# Patient Record
Sex: MALE | Race: WHITE | Employment: FULL TIME | URBAN - NONMETROPOLITAN AREA
[De-identification: names, ages, dates, MRNs, and addresses within clinical notes are randomized per-mention and may not be internally consistent; named-entity substitution may affect disease eponyms.]

---

## 2019-07-11 VITALS
WEIGHT: 176 LBS | HEART RATE: 92 BPM | HEIGHT: 69 IN | BODY MASS INDEX: 26.07 KG/M2 | TEMPERATURE: 98.1 F | DIASTOLIC BLOOD PRESSURE: 82 MMHG | SYSTOLIC BLOOD PRESSURE: 126 MMHG

## 2019-07-11 RX ORDER — RANITIDINE 150 MG/1
150 TABLET ORAL EVERY MORNING
COMMUNITY
End: 2019-10-11 | Stop reason: ALTCHOICE

## 2019-07-12 ENCOUNTER — OFFICE VISIT (OUTPATIENT)
Dept: FAMILY MEDICINE CLINIC | Age: 34
End: 2019-07-12
Payer: COMMERCIAL

## 2019-07-12 VITALS
BODY MASS INDEX: 25.92 KG/M2 | HEART RATE: 74 BPM | WEIGHT: 175 LBS | OXYGEN SATURATION: 99 % | SYSTOLIC BLOOD PRESSURE: 124 MMHG | DIASTOLIC BLOOD PRESSURE: 86 MMHG | HEIGHT: 69 IN | TEMPERATURE: 98.3 F

## 2019-07-12 DIAGNOSIS — F90.0 ATTENTION DEFICIT HYPERACTIVITY DISORDER (ADHD), PREDOMINANTLY INATTENTIVE TYPE: Primary | ICD-10-CM

## 2019-07-12 DIAGNOSIS — F34.1 DYSTHYMIA: ICD-10-CM

## 2019-07-12 PROCEDURE — 99213 OFFICE O/P EST LOW 20 MIN: CPT | Performed by: FAMILY MEDICINE

## 2019-07-12 RX ORDER — METHYLPHENIDATE HYDROCHLORIDE 20 MG/1
20 CAPSULE, EXTENDED RELEASE ORAL 2 TIMES DAILY
Qty: 60 CAPSULE | Refills: 0 | Status: SHIPPED | OUTPATIENT
Start: 2019-07-12 | End: 2019-07-12 | Stop reason: SDUPTHER

## 2019-07-12 RX ORDER — ESCITALOPRAM OXALATE 10 MG/1
15 TABLET ORAL DAILY
Qty: 135 TABLET | Refills: 1 | Status: SHIPPED | OUTPATIENT
Start: 2019-07-12 | End: 2019-10-11 | Stop reason: SDUPTHER

## 2019-07-12 RX ORDER — METHYLPHENIDATE HYDROCHLORIDE 20 MG/1
20 CAPSULE, EXTENDED RELEASE ORAL 2 TIMES DAILY
Qty: 60 CAPSULE | Refills: 0 | Status: SHIPPED | OUTPATIENT
Start: 2019-09-10 | End: 2019-10-11 | Stop reason: SDUPTHER

## 2019-07-12 RX ORDER — METHYLPHENIDATE HYDROCHLORIDE 20 MG/1
20 CAPSULE, EXTENDED RELEASE ORAL 2 TIMES DAILY
Qty: 60 CAPSULE | Refills: 0 | Status: SHIPPED | OUTPATIENT
Start: 2019-08-11 | End: 2019-07-12 | Stop reason: SDUPTHER

## 2019-07-12 RX ORDER — ALPRAZOLAM 0.25 MG/1
0.25 TABLET ORAL 2 TIMES DAILY
Qty: 180 TABLET | Refills: 0 | Status: SHIPPED | OUTPATIENT
Start: 2019-07-12 | End: 2019-10-11 | Stop reason: SDUPTHER

## 2019-07-12 ASSESSMENT — PATIENT HEALTH QUESTIONNAIRE - PHQ9
1. LITTLE INTEREST OR PLEASURE IN DOING THINGS: 0
SUM OF ALL RESPONSES TO PHQ QUESTIONS 1-9: 0
SUM OF ALL RESPONSES TO PHQ9 QUESTIONS 1 & 2: 0
2. FEELING DOWN, DEPRESSED OR HOPELESS: 0
SUM OF ALL RESPONSES TO PHQ QUESTIONS 1-9: 0

## 2019-09-19 ENCOUNTER — PATIENT MESSAGE (OUTPATIENT)
Dept: FAMILY MEDICINE CLINIC | Age: 34
End: 2019-09-19

## 2019-09-20 ENCOUNTER — OFFICE VISIT (OUTPATIENT)
Dept: FAMILY MEDICINE CLINIC | Age: 34
End: 2019-09-20
Payer: COMMERCIAL

## 2019-09-20 ENCOUNTER — HOSPITAL ENCOUNTER (OUTPATIENT)
Age: 34
Discharge: HOME | End: 2019-09-22
Payer: COMMERCIAL

## 2019-09-20 VITALS
TEMPERATURE: 97.8 F | DIASTOLIC BLOOD PRESSURE: 74 MMHG | SYSTOLIC BLOOD PRESSURE: 118 MMHG | OXYGEN SATURATION: 97 % | WEIGHT: 171 LBS | BODY MASS INDEX: 25.25 KG/M2 | HEART RATE: 114 BPM

## 2019-09-20 DIAGNOSIS — R10.32 LEFT LOWER QUADRANT PAIN: Primary | ICD-10-CM

## 2019-09-20 DIAGNOSIS — R30.0 DYSURIA: ICD-10-CM

## 2019-09-20 LAB
BILIRUBIN, POC: NORMAL
BLOOD URINE, POC: NORMAL
CLARITY, POC: CLEAR
COLOR, POC: NORMAL
GLUCOSE URINE, POC: NORMAL
KETONES, POC: NORMAL
LEUKOCYTE EST, POC: NORMAL
NITRITE, POC: NORMAL
PH, POC: 6.5
PROTEIN, POC: NORMAL
SPECIFIC GRAVITY, POC: 1.02
UROBILINOGEN, POC: >=8

## 2019-09-20 PROCEDURE — 81002 URINALYSIS NONAUTO W/O SCOPE: CPT | Performed by: FAMILY MEDICINE

## 2019-09-20 PROCEDURE — 87088 URINE BACTERIA CULTURE: CPT

## 2019-09-20 PROCEDURE — 99214 OFFICE O/P EST MOD 30 MIN: CPT | Performed by: FAMILY MEDICINE

## 2019-09-20 RX ORDER — CIPROFLOXACIN 500 MG/1
500 TABLET, FILM COATED ORAL 2 TIMES DAILY
Qty: 14 TABLET | Refills: 0 | Status: SHIPPED | OUTPATIENT
Start: 2019-09-20 | End: 2019-09-27

## 2019-09-22 LAB — URINE CULTURE, ROUTINE: NORMAL

## 2019-09-23 ENCOUNTER — PATIENT MESSAGE (OUTPATIENT)
Dept: FAMILY MEDICINE CLINIC | Age: 34
End: 2019-09-23

## 2019-10-11 ENCOUNTER — OFFICE VISIT (OUTPATIENT)
Dept: FAMILY MEDICINE CLINIC | Age: 34
End: 2019-10-11
Payer: COMMERCIAL

## 2019-10-11 VITALS
WEIGHT: 171 LBS | HEART RATE: 88 BPM | DIASTOLIC BLOOD PRESSURE: 74 MMHG | OXYGEN SATURATION: 97 % | TEMPERATURE: 98.7 F | BODY MASS INDEX: 25.33 KG/M2 | SYSTOLIC BLOOD PRESSURE: 118 MMHG | HEIGHT: 69 IN

## 2019-10-11 DIAGNOSIS — F90.0 ATTENTION DEFICIT HYPERACTIVITY DISORDER (ADHD), PREDOMINANTLY INATTENTIVE TYPE: ICD-10-CM

## 2019-10-11 DIAGNOSIS — F34.1 DYSTHYMIA: ICD-10-CM

## 2019-10-11 PROCEDURE — 99213 OFFICE O/P EST LOW 20 MIN: CPT | Performed by: FAMILY MEDICINE

## 2019-10-11 RX ORDER — METHYLPHENIDATE HYDROCHLORIDE 20 MG/1
20 CAPSULE, EXTENDED RELEASE ORAL 2 TIMES DAILY
Qty: 60 CAPSULE | Refills: 0 | Status: SHIPPED | OUTPATIENT
Start: 2019-10-11 | End: 2019-10-11 | Stop reason: SDUPTHER

## 2019-10-11 RX ORDER — ALPRAZOLAM 0.25 MG/1
0.25 TABLET ORAL 2 TIMES DAILY
Qty: 180 TABLET | Refills: 0 | Status: SHIPPED | OUTPATIENT
Start: 2019-10-11 | End: 2019-10-11 | Stop reason: SDUPTHER

## 2019-10-11 RX ORDER — ESCITALOPRAM OXALATE 10 MG/1
15 TABLET ORAL DAILY
Qty: 135 TABLET | Refills: 1 | Status: SHIPPED
Start: 2019-10-11 | End: 2020-04-10 | Stop reason: SDUPTHER

## 2019-10-11 RX ORDER — METHYLPHENIDATE HYDROCHLORIDE 20 MG/1
20 CAPSULE, EXTENDED RELEASE ORAL 2 TIMES DAILY
Qty: 60 CAPSULE | Refills: 0 | Status: SHIPPED | OUTPATIENT
Start: 2019-11-10 | End: 2019-10-11 | Stop reason: SDUPTHER

## 2019-10-11 RX ORDER — ALPRAZOLAM 0.25 MG/1
0.25 TABLET ORAL 2 TIMES DAILY
Qty: 180 TABLET | Refills: 0 | Status: SHIPPED | OUTPATIENT
Start: 2019-10-11 | End: 2020-01-09

## 2019-10-11 RX ORDER — METHYLPHENIDATE HYDROCHLORIDE 20 MG/1
20 CAPSULE, EXTENDED RELEASE ORAL 2 TIMES DAILY
Qty: 60 CAPSULE | Refills: 0 | Status: SHIPPED | OUTPATIENT
Start: 2019-12-09 | End: 2020-01-10 | Stop reason: SDUPTHER

## 2020-01-10 ENCOUNTER — OFFICE VISIT (OUTPATIENT)
Dept: FAMILY MEDICINE CLINIC | Age: 35
End: 2020-01-10
Payer: COMMERCIAL

## 2020-01-10 VITALS
HEART RATE: 71 BPM | SYSTOLIC BLOOD PRESSURE: 116 MMHG | HEIGHT: 70 IN | WEIGHT: 172 LBS | OXYGEN SATURATION: 99 % | DIASTOLIC BLOOD PRESSURE: 78 MMHG | TEMPERATURE: 98.2 F | BODY MASS INDEX: 24.62 KG/M2

## 2020-01-10 PROCEDURE — 99213 OFFICE O/P EST LOW 20 MIN: CPT | Performed by: FAMILY MEDICINE

## 2020-01-10 RX ORDER — METHYLPHENIDATE HYDROCHLORIDE 20 MG/1
20 CAPSULE, EXTENDED RELEASE ORAL 2 TIMES DAILY
Qty: 60 CAPSULE | Refills: 0 | Status: SHIPPED | OUTPATIENT
Start: 2020-01-10 | End: 2020-01-10 | Stop reason: SDUPTHER

## 2020-01-10 RX ORDER — ALPRAZOLAM 0.25 MG/1
0.25 TABLET ORAL 2 TIMES DAILY
COMMUNITY
Start: 2009-02-24 | End: 2020-01-10 | Stop reason: SDUPTHER

## 2020-01-10 RX ORDER — ALPRAZOLAM 0.25 MG/1
0.25 TABLET ORAL 2 TIMES DAILY
Qty: 180 TABLET | Refills: 0 | Status: SHIPPED | OUTPATIENT
Start: 2020-01-10 | End: 2020-04-10 | Stop reason: SDUPTHER

## 2020-01-10 RX ORDER — METHYLPHENIDATE HYDROCHLORIDE 20 MG/1
20 CAPSULE, EXTENDED RELEASE ORAL 2 TIMES DAILY
Qty: 60 CAPSULE | Refills: 0 | Status: SHIPPED | OUTPATIENT
Start: 2020-02-09 | End: 2020-01-10 | Stop reason: SDUPTHER

## 2020-01-10 RX ORDER — METHYLPHENIDATE HYDROCHLORIDE 20 MG/1
20 CAPSULE, EXTENDED RELEASE ORAL 2 TIMES DAILY
Qty: 60 CAPSULE | Refills: 0 | Status: SHIPPED | OUTPATIENT
Start: 2020-03-09 | End: 2020-04-10 | Stop reason: SDUPTHER

## 2020-01-10 NOTE — PROGRESS NOTES
1/10/20    Chief Complaint   Patient presents with    Anxiety        S: patient here for PE of chronic medical problems, med recheck/refill, Oarrs review. Doing well. Family History   Problem Relation Age of Onset    High Blood Pressure Father     Cancer Father        Past Surgical History:   Procedure Laterality Date    WISDOM TOOTH EXTRACTION         Social History     Tobacco Use    Smoking status: Former Smoker     Packs/day: 1.00     Years: 10.00     Pack years: 10.00     Types: Cigarettes     Last attempt to quit: 2010     Years since quittin.5    Smokeless tobacco: Never Used   Substance Use Topics    Alcohol use: No    Drug use: No       ROS:  No CP, No palpitations,   No sob, No cough,   No abd pain, No heartburn,   No headaches,   No tingling, No numbness, No weakness,   No bowel changes, No hematochezia, No melena,  No bladder changes, No hematuria  No skin rashes, No skin lesions. No vision changes, No hearing changes,   No polyuria, polydipsia, polyphagia. Stable mood. ROS otherwise negative unless as listed in HPI. Chart reviewed and updated where appropriate for PMH, Fam, and Soc Hx. Physical Exam   /78   Pulse 71   Temp 98.2 °F (36.8 °C) (Temporal)   Ht 5' 10\" (1.778 m)   Wt 172 lb (78 kg)   SpO2 99%   BMI 24.68 kg/m²   Wt Readings from Last 3 Encounters:   01/10/20 172 lb (78 kg)   10/11/19 171 lb (77.6 kg)   19 171 lb (77.6 kg)       Constitutional:    He is oriented to person, place, and time. He appears well-developed and well-nourished. HENT:    Right Ear: Tympanic membrane, external ear and ear canal normal.    Left Ear: Tympanic membrane, external ear and ear canal normal.    Nose: Nose normal.    Mouth/Throat: Oropharynx is clear and moist.   Eyes:    Conjunctivae are normal.    Pupils are equal, round, and reactive to light. EOMI. Neck:    Normal range of motion. No thyromegaly or nodules noted. No bruit.   Cardiovascular:

## 2020-03-15 ENCOUNTER — PATIENT MESSAGE (OUTPATIENT)
Dept: FAMILY MEDICINE CLINIC | Age: 35
End: 2020-03-15

## 2020-04-03 NOTE — TELEPHONE ENCOUNTER
Pt has 3 meds that need refilled. Alprazelam, Lexapro and Ritalin. [facility] [place]. Also would like to know how this will work with his Ritalin, he usually picks up a script every month? Please contact pt with further instructions.

## 2020-04-07 RX ORDER — METHYLPHENIDATE HYDROCHLORIDE 20 MG/1
20 CAPSULE, EXTENDED RELEASE ORAL 2 TIMES DAILY
Qty: 60 CAPSULE | Refills: 0 | OUTPATIENT
Start: 2020-04-07 | End: 2020-05-07

## 2020-04-07 RX ORDER — ESCITALOPRAM OXALATE 10 MG/1
15 TABLET ORAL DAILY
Qty: 135 TABLET | Refills: 1 | OUTPATIENT
Start: 2020-04-07 | End: 2020-07-07

## 2020-04-07 RX ORDER — ALPRAZOLAM 0.25 MG/1
0.25 TABLET ORAL 2 TIMES DAILY
Qty: 180 TABLET | Refills: 0 | OUTPATIENT
Start: 2020-04-07 | End: 2020-07-06

## 2020-04-10 ENCOUNTER — VIRTUAL VISIT (OUTPATIENT)
Dept: PRIMARY CARE CLINIC | Age: 35
End: 2020-04-10
Payer: COMMERCIAL

## 2020-04-10 VITALS — WEIGHT: 172 LBS | BODY MASS INDEX: 24.62 KG/M2 | HEIGHT: 70 IN | TEMPERATURE: 97.7 F

## 2020-04-10 PROCEDURE — 99213 OFFICE O/P EST LOW 20 MIN: CPT | Performed by: FAMILY MEDICINE

## 2020-04-10 RX ORDER — ALPRAZOLAM 0.25 MG/1
0.25 TABLET ORAL 2 TIMES DAILY
Qty: 180 TABLET | Refills: 0 | Status: SHIPPED
Start: 2020-04-10 | End: 2020-06-18 | Stop reason: SDUPTHER

## 2020-04-10 RX ORDER — METHYLPHENIDATE HYDROCHLORIDE 20 MG/1
20 CAPSULE, EXTENDED RELEASE ORAL 2 TIMES DAILY
Qty: 60 CAPSULE | Refills: 0 | Status: SHIPPED
Start: 2020-04-10 | End: 2020-05-26 | Stop reason: SDUPTHER

## 2020-04-10 RX ORDER — ESCITALOPRAM OXALATE 10 MG/1
15 TABLET ORAL DAILY
Qty: 135 TABLET | Refills: 1 | Status: SHIPPED
Start: 2020-04-10 | End: 2020-06-18 | Stop reason: SDUPTHER

## 2020-04-10 ASSESSMENT — ENCOUNTER SYMPTOMS
TROUBLE SWALLOWING: 0
BACK PAIN: 0
VOMITING: 0
EYE PAIN: 0
ABDOMINAL PAIN: 0
NAUSEA: 0
SINUS PAIN: 0
EYE DISCHARGE: 0
BLOOD IN STOOL: 0
PHOTOPHOBIA: 0
DIARRHEA: 0
CHEST TIGHTNESS: 0
EYE REDNESS: 0
ALLERGIC/IMMUNOLOGIC NEGATIVE: 1
COUGH: 0
SHORTNESS OF BREATH: 0
SORE THROAT: 0

## 2020-04-10 NOTE — PROGRESS NOTES
EXAMINATION:  [ INSTRUCTIONS:  \"[x]\" Indicates a positive item  \"[]\" Indicates a negative item  -- DELETE ALL ITEMS NOT EXAMINED]       Constitutional: [x] Appears well-developed and well-nourished [x] No apparent distress      [] Abnormal-   Mental status  [x] Alert and awake  [x] Oriented to person/place/time [x]Able to follow commands      Eyes:  EOM    [x]  Normal  [] Abnormal-  Sclera  [x]  Normal  [] Abnormal -         Discharge [x]  None visible  [] Abnormal -    HENT:   [x] Normocephalic, atraumatic. [] Abnormal   [x] Mouth/Throat: Mucous membranes are moist.     External Ears [x] Normal  [] Abnormal-     Neck: [x] No visualized mass     Pulmonary/Chest: [x] Respiratory effort normal.  [x] No visualized signs of difficulty breathing or respiratory distress        [] Abnormal-      Musculoskeletal:   [x] Normal gait with no signs of ataxia         [x] Normal range of motion of neck        [] Abnormal-       Neurological:        [x] No Facial Asymmetry (Cranial nerve 7 motor function) (limited exam to video visit)          [x] No gaze palsy        [] Abnormal-         Skin:        [x] No significant exanthematous lesions or discoloration noted on facial skin         [] Abnormal-            Psychiatric:       [x] Normal Affect [x] No Hallucinations        [] Abnormal-     Other pertinent observable physical exam findings-     ASSESSMENT/PLAN:  1. Dysthymia  - ALPRAZolam (XANAX) 0.25 MG tablet; Take 1 tablet by mouth 2 times daily for 90 days. Dispense: 180 tablet; Refill: 0  - escitalopram (LEXAPRO) 10 MG tablet; Take 1.5 tablets by mouth daily  Dispense: 135 tablet; Refill: 1    2. Attention deficit hyperactivity disorder (ADHD), predominantly inattentive type  - methylphenidate (RITALIN LA) 20 MG extended release capsule; Take 1 capsule by mouth 2 times daily for 30 days. Dispense: 60 capsule; Refill: 0      Return in about 3 months (around 7/10/2020).     Jennyfer Cote is a 29 y.o. male being evaluated by a Virtual Visit (video visit) encounter to address concerns as mentioned above. A caregiver was present when appropriate. Due to this being a TeleHealth encounter (During WTTOW-10 public health emergency), evaluation of the following organ systems was limited: Vitals/Constitutional/EENT/Resp/CV/GI//MS/Neuro/Skin/Heme-Lymph-Imm. Pursuant to the emergency declaration under the [address] [place] [address] and the [employer] Act, this Virtual Visit was conducted with patient's (and/or legal guardian's) consent, to reduce the patient's risk of exposure to COVID-19 and provide necessary medical care. The patient (and/or legal guardian) has also been advised to contact this office for worsening conditions or problems, and seek emergency medical treatment and/or call 911 if deemed necessary. Services were provided through a video synchronous discussion virtually to substitute for in-person clinic visit. Patient and provider were located at their individual homes. --Erin Hilario, DO on 4/10/2020 at 11:59 AM    An electronic signature was used to authenticate this note.

## 2020-05-26 RX ORDER — METHYLPHENIDATE HYDROCHLORIDE 20 MG/1
20 CAPSULE, EXTENDED RELEASE ORAL 2 TIMES DAILY
Qty: 60 CAPSULE | Refills: 0 | Status: SHIPPED
Start: 2020-05-26 | End: 2020-06-18 | Stop reason: SDUPTHER

## 2020-06-18 ENCOUNTER — OFFICE VISIT (OUTPATIENT)
Dept: PRIMARY CARE CLINIC | Age: 35
End: 2020-06-18
Payer: COMMERCIAL

## 2020-06-18 VITALS
DIASTOLIC BLOOD PRESSURE: 78 MMHG | SYSTOLIC BLOOD PRESSURE: 130 MMHG | WEIGHT: 173 LBS | OXYGEN SATURATION: 98 % | BODY MASS INDEX: 24.77 KG/M2 | HEART RATE: 81 BPM | TEMPERATURE: 98.4 F | HEIGHT: 70 IN

## 2020-06-18 PROCEDURE — 99214 OFFICE O/P EST MOD 30 MIN: CPT | Performed by: FAMILY MEDICINE

## 2020-06-18 RX ORDER — METHYLPHENIDATE HYDROCHLORIDE 20 MG/1
20 CAPSULE, EXTENDED RELEASE ORAL DAILY
Qty: 60 CAPSULE | Refills: 0 | Status: SHIPPED
Start: 2020-07-18 | End: 2020-09-08

## 2020-06-18 RX ORDER — ESCITALOPRAM OXALATE 10 MG/1
15 TABLET ORAL DAILY
Qty: 135 TABLET | Refills: 1 | Status: SHIPPED
Start: 2020-06-18 | End: 2020-09-17 | Stop reason: SDUPTHER

## 2020-06-18 RX ORDER — METHYLPHENIDATE HYDROCHLORIDE 20 MG/1
20 CAPSULE, EXTENDED RELEASE ORAL DAILY
Qty: 60 CAPSULE | Refills: 0 | Status: SHIPPED
Start: 2020-08-18 | End: 2020-09-08

## 2020-06-18 RX ORDER — METHYLPHENIDATE HYDROCHLORIDE 20 MG/1
20 CAPSULE, EXTENDED RELEASE ORAL 2 TIMES DAILY
Qty: 60 CAPSULE | Refills: 0 | Status: SHIPPED
Start: 2020-06-18 | End: 2020-09-08 | Stop reason: SDUPTHER

## 2020-06-18 RX ORDER — ALPRAZOLAM 0.25 MG/1
0.25 TABLET ORAL 2 TIMES DAILY
Qty: 180 TABLET | Refills: 0 | Status: SHIPPED | OUTPATIENT
Start: 2020-06-18 | End: 2020-09-16

## 2020-06-18 ASSESSMENT — ENCOUNTER SYMPTOMS
WHEEZING: 0
BACK PAIN: 0
SHORTNESS OF BREATH: 0
VOMITING: 0
DIARRHEA: 0
ABDOMINAL PAIN: 0
COUGH: 0
NAUSEA: 0
CONSTIPATION: 0

## 2020-09-06 ENCOUNTER — PATIENT MESSAGE (OUTPATIENT)
Dept: PRIMARY CARE CLINIC | Age: 35
End: 2020-09-06

## 2020-09-08 RX ORDER — METHYLPHENIDATE HYDROCHLORIDE 20 MG/1
20 CAPSULE, EXTENDED RELEASE ORAL 2 TIMES DAILY
Qty: 18 CAPSULE | Refills: 0 | Status: SHIPPED
Start: 2020-09-08 | End: 2020-12-17

## 2020-09-08 NOTE — TELEPHONE ENCOUNTER
From: Delroy Means  To: Isaac Linder, DO  Sent: 9/6/2020 3:42 PM EDT  Subject: Prescription Question    Hello,  So I've been on 20mg twice a day for the Methylphenidate. I noticed I only had like 5 or 6 pills left this morning after just refilling 2 weeks. [facility] 1 a day for 30 total. They said I needed to reach out to you for a new corrected prescription for the twice daily. Im taking 1 a day now instead of 2 to make them last until next week. Thanks!

## 2020-09-17 ENCOUNTER — OFFICE VISIT (OUTPATIENT)
Dept: PRIMARY CARE CLINIC | Age: 35
End: 2020-09-17
Payer: COMMERCIAL

## 2020-09-17 VITALS
BODY MASS INDEX: 24.48 KG/M2 | WEIGHT: 171 LBS | DIASTOLIC BLOOD PRESSURE: 80 MMHG | HEART RATE: 72 BPM | TEMPERATURE: 97.2 F | HEIGHT: 70 IN | OXYGEN SATURATION: 97 % | SYSTOLIC BLOOD PRESSURE: 124 MMHG

## 2020-09-17 PROCEDURE — 99214 OFFICE O/P EST MOD 30 MIN: CPT | Performed by: FAMILY MEDICINE

## 2020-09-17 RX ORDER — ALPRAZOLAM 0.25 MG/1
0.25 TABLET ORAL 2 TIMES DAILY PRN
COMMUNITY
End: 2020-09-17 | Stop reason: SDUPTHER

## 2020-09-17 RX ORDER — METHYLPHENIDATE HYDROCHLORIDE 20 MG/1
20 CAPSULE, EXTENDED RELEASE ORAL 2 TIMES DAILY
Qty: 60 CAPSULE | Refills: 0 | Status: SHIPPED
Start: 2020-11-12 | End: 2020-12-17

## 2020-09-17 RX ORDER — ESCITALOPRAM OXALATE 10 MG/1
15 TABLET ORAL DAILY
Qty: 135 TABLET | Refills: 1 | Status: SHIPPED
Start: 2020-09-17 | End: 2021-03-17

## 2020-09-17 RX ORDER — ALPRAZOLAM 0.25 MG/1
0.25 TABLET ORAL 2 TIMES DAILY PRN
Qty: 180 TABLET | Refills: 0 | Status: SHIPPED | OUTPATIENT
Start: 2020-09-17 | End: 2020-10-17

## 2020-09-17 RX ORDER — METHYLPHENIDATE HYDROCHLORIDE 20 MG/1
20 CAPSULE, EXTENDED RELEASE ORAL 2 TIMES DAILY
Qty: 60 CAPSULE | Refills: 0 | Status: SHIPPED
Start: 2020-09-17 | End: 2020-12-17

## 2020-09-17 RX ORDER — METHYLPHENIDATE HYDROCHLORIDE 20 MG/1
20 CAPSULE, EXTENDED RELEASE ORAL 2 TIMES DAILY
Qty: 60 CAPSULE | Refills: 0 | Status: SHIPPED
Start: 2020-10-15 | End: 2020-12-17

## 2020-09-17 ASSESSMENT — ENCOUNTER SYMPTOMS
VOMITING: 0
CONSTIPATION: 0
ABDOMINAL PAIN: 0
SHORTNESS OF BREATH: 0
COUGH: 0
BACK PAIN: 0
DIARRHEA: 0
WHEEZING: 0
NAUSEA: 0

## 2020-09-17 NOTE — PROGRESS NOTES
20  Delroy Plate :  Sex: male  Age: 28 y.o. Chief Complaint   Patient presents with    Medication Refill     HPI:  28 y.o. male patient presents today for 3 month(s) follow up of ADHD, IRINEO. Former patient of Dr. Jerri Sandra. Patient's chart, medical, surgical and medication history all reviewed. ADHD  Patient presents for follow up of ADHD. He has been on medication for ADHD for many year(s). His current medication is Ritalin LA- 20 mg BID. Patient has been on current medication for many year(s). Side effects of medications include None. compliant all of the time. Symptoms that have improved include impulsivity, inability to follow directions and inattention. Anxiety  Patient complains of evaluation of anxiety disorder and panic attacks. He has the following anxiety symptoms: difficulty concentrating, feelings of losing control, irritable, racing thoughts. Onset of symptoms was approximately several years ago, stable since that time. He denies current suicidal and homicidal ideation. Previous treatment includes Lexapro and Xanax and no therapy. He complains of the following side effects from the treatment: none. Patient states that he hasn't had a panic attack in a long time. ROS:  Review of Systems   Constitutional: Negative for chills, fatigue and fever. Respiratory: Negative for cough, shortness of breath and wheezing. Cardiovascular: Negative for chest pain and palpitations. Gastrointestinal: Negative for abdominal pain, constipation, diarrhea, nausea and vomiting. Musculoskeletal: Negative for arthralgias and back pain. Skin: Negative for rash. Neurological: Negative for dizziness and headaches. Psychiatric/Behavioral: Positive for decreased concentration. Negative for dysphoric mood. The patient is nervous/anxious and is hyperactive. All other systems reviewed and are negative.        Current Outpatient Medications on File Prior to Visit HCT, PLT, MCV, MCH, MCHC, RDW, NRBC, SEGSPCT, BANDSPCT, BLASTSPCT, METASPCT, LYMPHOPCT, PROMYELOPCT, MONOPCT, MYELOPCT, EOSPCT, BASOPCT, MONOSABS, LYMPHSABS, EOSABS, BASOSABS, DIFFTYPE  CMP:  No results found for: NA, K, CL, CO2, BUN, CREATININE, GFRAA, AGRATIO, LABGLOM, GLUCOSE, PROT, LABALBU, CALCIUM, BILITOT, ALKPHOS, AST, ALT  HgBA1c:  No results found for: LABA1C  Microalbumen/Creatinine ratio:  No components found for: RUCREAT  FLP:  No results found for: TRIG, HDL, LDLCALC, LDLDIRECT, LABVLDL  TSH:  No results found for: TSH       Assessment and Plan:  Petra Whitfield was seen today for medication refill. Diagnoses and all orders for this visit:    ADHD, predominantly inattentive type  -     methylphenidate (RITALIN LA) 20 MG extended release capsule; Take 1 capsule by mouth 2 times daily for 30 days. -     methylphenidate (RITALIN LA) 20 MG extended release capsule; Take 1 capsule by mouth 2 times daily for 30 days. -     methylphenidate (RITALIN LA) 20 MG extended release capsule; Take 1 capsule by mouth 2 times daily for 30 days. IRINEO (generalized anxiety disorder)  -     escitalopram (LEXAPRO) 10 MG tablet; Take 1.5 tablets by mouth daily  -     ALPRAZolam (XANAX) 0.25 MG tablet; Take 1 tablet by mouth 2 times daily as needed for Anxiety for up to 30 days. -     CBC Auto Differential; Future  -     Comprehensive Metabolic Panel; Future  -     TSH without Reflex; Future    Screening for cardiovascular condition  -     Lipid Panel; Future    Vitamin D insufficiency  -     Vitamin D 25 Hydroxy; Future    OARRS reviewed and is appropriate. Patient doing well overall. Needs routine labs given strong family history of CV disease. MGF passed away of heart disease at early 76s this weekend and PGF also with multiple MI. Return in about 3 months (around 12/17/2020) for Medication recheck.       Seen By:  Jessica Sarabia DO

## 2020-09-18 ENCOUNTER — TELEPHONE (OUTPATIENT)
Dept: PRIMARY CARE CLINIC | Age: 35
End: 2020-09-18

## 2020-09-18 NOTE — TELEPHONE ENCOUNTER
Pharmacy calling to make sure that we know that it is not the drug itself that needs the PA it is the quantity. She said if a regular PA is done on the med it will say that a PA is not required.

## 2020-09-18 NOTE — TELEPHONE ENCOUNTER
Called [facility] to get number to call for prior auth. [phone]. Prior auth for quantity. Not for approval of medications. They stated in July #30 was filled and not the #60 which is what got the amount is off. A back on track over ride needs done. Was transferred to [phone]. She stated #18 was given on 9/04/2020. Spoke with Yancy Cui. She needs to get this approved from a Senior Rep and will notify the pharmacy when it is approved, since it is the weekend. She states it could take 24-48 hours.

## 2020-12-04 DIAGNOSIS — E55.9 VITAMIN D INSUFFICIENCY: ICD-10-CM

## 2020-12-04 DIAGNOSIS — F41.1 GAD (GENERALIZED ANXIETY DISORDER): ICD-10-CM

## 2020-12-04 DIAGNOSIS — Z13.6 SCREENING FOR CARDIOVASCULAR CONDITION: ICD-10-CM

## 2020-12-04 LAB
ALBUMIN SERPL-MCNC: 4.7 G/DL (ref 3.5–5.2)
ALP BLD-CCNC: 70 U/L (ref 40–129)
ALT SERPL-CCNC: 14 U/L (ref 0–40)
ANION GAP SERPL CALCULATED.3IONS-SCNC: 12 MMOL/L (ref 7–16)
AST SERPL-CCNC: 23 U/L (ref 0–39)
BASOPHILS ABSOLUTE: 0.02 E9/L (ref 0–0.2)
BASOPHILS RELATIVE PERCENT: 0.4 % (ref 0–2)
BILIRUB SERPL-MCNC: 1 MG/DL (ref 0–1.2)
BUN BLDV-MCNC: 14 MG/DL (ref 6–20)
CALCIUM SERPL-MCNC: 10 MG/DL (ref 8.6–10.2)
CHLORIDE BLD-SCNC: 100 MMOL/L (ref 98–107)
CHOLESTEROL, TOTAL: 174 MG/DL (ref 0–199)
CO2: 27 MMOL/L (ref 22–29)
CREAT SERPL-MCNC: 1 MG/DL (ref 0.7–1.2)
EOSINOPHILS ABSOLUTE: 0.05 E9/L (ref 0.05–0.5)
EOSINOPHILS RELATIVE PERCENT: 1 % (ref 0–6)
GFR AFRICAN AMERICAN: >60
GFR NON-AFRICAN AMERICAN: >60 ML/MIN/1.73
GLUCOSE BLD-MCNC: 93 MG/DL (ref 74–99)
HCT VFR BLD CALC: 48.2 % (ref 37–54)
HDLC SERPL-MCNC: 50 MG/DL
HEMOGLOBIN: 16.1 G/DL (ref 12.5–16.5)
IMMATURE GRANULOCYTES #: 0.01 E9/L
IMMATURE GRANULOCYTES %: 0.2 % (ref 0–5)
LDL CHOLESTEROL CALCULATED: 109 MG/DL (ref 0–99)
LYMPHOCYTES ABSOLUTE: 1.39 E9/L (ref 1.5–4)
LYMPHOCYTES RELATIVE PERCENT: 27.9 % (ref 20–42)
MCH RBC QN AUTO: 29.4 PG (ref 26–35)
MCHC RBC AUTO-ENTMCNC: 33.4 % (ref 32–34.5)
MCV RBC AUTO: 88.1 FL (ref 80–99.9)
MONOCYTES ABSOLUTE: 0.29 E9/L (ref 0.1–0.95)
MONOCYTES RELATIVE PERCENT: 5.8 % (ref 2–12)
NEUTROPHILS ABSOLUTE: 3.22 E9/L (ref 1.8–7.3)
NEUTROPHILS RELATIVE PERCENT: 64.7 % (ref 43–80)
PDW BLD-RTO: 12.5 FL (ref 11.5–15)
PLATELET # BLD: 279 E9/L (ref 130–450)
PMV BLD AUTO: 10.5 FL (ref 7–12)
POTASSIUM SERPL-SCNC: 4.2 MMOL/L (ref 3.5–5)
RBC # BLD: 5.47 E12/L (ref 3.8–5.8)
SODIUM BLD-SCNC: 139 MMOL/L (ref 132–146)
TOTAL PROTEIN: 8.3 G/DL (ref 6.4–8.3)
TRIGL SERPL-MCNC: 77 MG/DL (ref 0–149)
TSH SERPL DL<=0.05 MIU/L-ACNC: 2.61 UIU/ML (ref 0.27–4.2)
VITAMIN D 25-HYDROXY: 41 NG/ML (ref 30–100)
VLDLC SERPL CALC-MCNC: 15 MG/DL
WBC # BLD: 5 E9/L (ref 4.5–11.5)

## 2020-12-17 ENCOUNTER — OFFICE VISIT (OUTPATIENT)
Dept: PRIMARY CARE CLINIC | Age: 35
End: 2020-12-17
Payer: COMMERCIAL

## 2020-12-17 VITALS
SYSTOLIC BLOOD PRESSURE: 122 MMHG | DIASTOLIC BLOOD PRESSURE: 80 MMHG | BODY MASS INDEX: 24.48 KG/M2 | OXYGEN SATURATION: 97 % | HEIGHT: 70 IN | WEIGHT: 171 LBS | TEMPERATURE: 97.6 F | HEART RATE: 70 BPM

## 2020-12-17 PROCEDURE — 99214 OFFICE O/P EST MOD 30 MIN: CPT | Performed by: FAMILY MEDICINE

## 2020-12-17 RX ORDER — METHYLPHENIDATE HYDROCHLORIDE 20 MG/1
20 CAPSULE, EXTENDED RELEASE ORAL 2 TIMES DAILY
Qty: 60 CAPSULE | Refills: 0 | Status: SHIPPED
Start: 2020-12-17 | End: 2021-03-17

## 2020-12-17 RX ORDER — ALPRAZOLAM 0.25 MG/1
0.25 TABLET ORAL 2 TIMES DAILY PRN
COMMUNITY
End: 2021-03-17

## 2020-12-17 RX ORDER — ALPRAZOLAM 0.25 MG/1
0.25 TABLET ORAL 2 TIMES DAILY PRN
Qty: 180 TABLET | Refills: 0 | Status: SHIPPED
Start: 2020-12-17 | End: 2021-03-17 | Stop reason: SDUPTHER

## 2020-12-17 RX ORDER — METHYLPHENIDATE HYDROCHLORIDE 20 MG/1
20 CAPSULE, EXTENDED RELEASE ORAL 2 TIMES DAILY
COMMUNITY
End: 2022-06-21

## 2020-12-17 RX ORDER — METHYLPHENIDATE HYDROCHLORIDE 20 MG/1
20 CAPSULE, EXTENDED RELEASE ORAL 2 TIMES DAILY
Qty: 60 CAPSULE | Refills: 0 | Status: SHIPPED
Start: 2021-02-11 | End: 2021-03-17

## 2020-12-17 RX ORDER — METHYLPHENIDATE HYDROCHLORIDE 20 MG/1
20 CAPSULE, EXTENDED RELEASE ORAL 2 TIMES DAILY
Qty: 60 CAPSULE | Refills: 0 | Status: SHIPPED
Start: 2021-01-14 | End: 2021-03-17

## 2020-12-17 ASSESSMENT — ENCOUNTER SYMPTOMS
COUGH: 0
ABDOMINAL PAIN: 0
VOMITING: 0
BACK PAIN: 0
CONSTIPATION: 0
DIARRHEA: 0
WHEEZING: 0
SHORTNESS OF BREATH: 0
NAUSEA: 0

## 2020-12-17 NOTE — PROGRESS NOTES
20  Delroy Plate : [id number] Sex: male  Age: 28 y.o. Chief Complaint   Patient presents with    ADHD    Anxiety     HPI:  28 y.o. male patient presents today for 3 month(s) follow up of ADHD, IRINEO and to review FBW. Former patient of Dr. Ananda Yepez. Patient's chart, medical, surgical and medication history all reviewed. ADHD  Patient presents for follow up of ADHD. He has been on medication for ADHD for many year(s). His current medication is Ritalin LA- 20 mg BID. Patient has been on current medication for many year(s). Side effects of medications include None. compliant all of the time. Symptoms that have improved include impulsivity, inability to follow directions and inattention. Anxiety  Patient complains of evaluation of anxiety disorder and panic attacks. He has the following anxiety symptoms: difficulty concentrating, feelings of losing control, irritable, racing thoughts. Onset of symptoms was approximately several years ago, stable since that time. He denies current suicidal and homicidal ideation. Previous treatment includes Lexapro and Xanax and no therapy. He complains of the following side effects from the treatment: none. Patient states that he hasn't had a panic attack in a long time. ROS:  Review of Systems   Constitutional: Negative for chills, fatigue and fever. Respiratory: Negative for cough, shortness of breath and wheezing. Cardiovascular: Negative for chest pain and palpitations. Gastrointestinal: Negative for abdominal pain, constipation, diarrhea, nausea and vomiting. Musculoskeletal: Negative for arthralgias and back pain. Skin: Negative for rash. Neurological: Negative for dizziness and headaches. Psychiatric/Behavioral: Positive for decreased concentration. Negative for dysphoric mood. The patient is nervous/anxious and is hyperactive. All other systems reviewed and are negative.        Current Outpatient Medications on File Prior to Visit   Medication Sig Dispense Refill    methylphenidate (RITALIN LA) 20 MG extended release capsule Take 20 mg by mouth 2 times daily.  ALPRAZolam (XANAX) 0.25 MG tablet Take 0.25 mg by mouth 2 times daily as needed for Sleep.  escitalopram (LEXAPRO) 10 MG tablet Take 1.5 tablets by mouth daily 135 tablet 1     No current facility-administered medications on file prior to visit.         No Known Allergies    Past Medical History:   Diagnosis Date    ADHD (attention deficit hyperactivity disorder)     Anxiety      Past Surgical History:   Procedure Laterality Date    WISDOM TOOTH EXTRACTION       Family History   Problem Relation Age of Onset    High Blood Pressure Father     Cancer Father         colon @ 46    Heart Disease Maternal Grandfather     Heart Attack Paternal Grandfather      Social History     Socioeconomic History    Marital status:      Spouse name: Not on file    Number of children: Not on file    Years of education: Not on file    Highest education level: Not on file   Occupational History    Not on file   Social Needs    Financial resource strain: Not on file    Food insecurity     Worry: Not on file     Inability: Not on file    Transportation needs     Medical: Not on file     Non-medical: Not on file   Tobacco Use    Smoking status: Former Smoker     Packs/day: 1.00     Years: 10.00     Pack years: 10.00     Types: Cigarettes     Quit date: 7/12/2010     Years since quitting: 10.4    Smokeless tobacco: Never Used   Substance and Sexual Activity    Alcohol use: No    Drug use: No    Sexual activity: Not on file   Lifestyle    Physical activity     Days per week: Not on file     Minutes per session: Not on file    Stress: Not on file   Relationships    Social connections     Talks on phone: Not on file     Gets together: Not on file     Attends [employer] service: Not on file     Active member of club or organization: Not on file     Attends meetings of of motion. General: No tenderness or deformity. Right lower leg: No edema. Left lower leg: No edema. Lymphadenopathy:      Cervical: No cervical adenopathy. Skin:     General: Skin is warm and dry. Findings: No rash. Neurological:      General: No focal deficit present. Mental Status: He is alert and oriented to person, place, and time. Gait: Gait normal.   Psychiatric:         Mood and Affect: Mood and affect normal.         Speech: Speech normal.         Behavior: Behavior normal.         Thought Content: Thought content normal.         Labs:  CBC with Differential:    Lab Results   Component Value Date    WBC 5.0 12/04/2020    RBC 5.47 12/04/2020    HGB 16.1 12/04/2020    HCT 48.2 12/04/2020     12/04/2020    MCV 88.1 12/04/2020    MCH 29.4 12/04/2020    MCHC 33.4 12/04/2020    RDW 12.5 12/04/2020    LYMPHOPCT 27.9 12/04/2020    MONOPCT 5.8 12/04/2020    BASOPCT 0.4 12/04/2020    MONOSABS 0.29 12/04/2020    LYMPHSABS 1.39 12/04/2020    EOSABS 0.05 12/04/2020    BASOSABS 0.02 12/04/2020     CMP:    Lab Results   Component Value Date     12/04/2020    K 4.2 12/04/2020     12/04/2020    CO2 27 12/04/2020    BUN 14 12/04/2020    CREATININE 1.0 12/04/2020    GFRAA >60 12/04/2020    LABGLOM >60 12/04/2020    GLUCOSE 93 12/04/2020    PROT 8.3 12/04/2020    LABALBU 4.7 12/04/2020    CALCIUM 10.0 12/04/2020    BILITOT 1.0 12/04/2020    ALKPHOS 70 12/04/2020    AST 23 12/04/2020    ALT 14 12/04/2020     HgBA1c:  No results found for: LABA1C  Microalbumen/Creatinine ratio:  No components found for: RUCREAT  FLP:    Lab Results   Component Value Date    TRIG 77 12/04/2020    HDL 50 12/04/2020    LDLCALC 109 12/04/2020    LABVLDL 15 12/04/2020     TSH:    Lab Results   Component Value Date    TSH 2.610 12/04/2020          Assessment and Plan:  [place] was seen today for adhd and anxiety.     Diagnoses and all orders for this visit:    Generalized anxiety disorder  - ALPRAZolam (XANAX) 0.25 MG tablet; Take 1 tablet by mouth 2 times daily as needed for Sleep for up to 90 days. ADHD, predominantly inattentive type  -     methylphenidate (RITALIN LA) 20 MG extended release capsule; Take 1 capsule by mouth 2 times daily for 30 days. -     methylphenidate (RITALIN LA) 20 MG extended release capsule; Take 1 capsule by mouth 2 times daily for 30 days. -     methylphenidate (RITALIN LA) 20 MG extended release capsule; Take 1 capsule by mouth 2 times daily for 30 days. OARRS reviewed and is appropriate. Labs reviewed in detail. Stable on all medications. Return in about 3 months (around 3/17/2021) for Medication recheck.       Seen By:  Ryan Mckoy DO

## 2021-03-17 ENCOUNTER — OFFICE VISIT (OUTPATIENT)
Dept: PRIMARY CARE CLINIC | Age: 36
End: 2021-03-17
Payer: COMMERCIAL

## 2021-03-17 VITALS
HEIGHT: 70 IN | SYSTOLIC BLOOD PRESSURE: 122 MMHG | WEIGHT: 172 LBS | BODY MASS INDEX: 24.62 KG/M2 | OXYGEN SATURATION: 98 % | DIASTOLIC BLOOD PRESSURE: 74 MMHG | TEMPERATURE: 97.5 F | HEART RATE: 78 BPM

## 2021-03-17 DIAGNOSIS — F90.0 ADHD, PREDOMINANTLY INATTENTIVE TYPE: ICD-10-CM

## 2021-03-17 DIAGNOSIS — F41.1 GENERALIZED ANXIETY DISORDER: ICD-10-CM

## 2021-03-17 PROCEDURE — 99213 OFFICE O/P EST LOW 20 MIN: CPT | Performed by: FAMILY MEDICINE

## 2021-03-17 RX ORDER — METHYLPHENIDATE HYDROCHLORIDE 20 MG/1
20 CAPSULE, EXTENDED RELEASE ORAL 2 TIMES DAILY
Qty: 60 CAPSULE | Refills: 0 | Status: SHIPPED
Start: 2021-03-17 | End: 2021-06-16

## 2021-03-17 RX ORDER — METHYLPHENIDATE HYDROCHLORIDE 20 MG/1
20 CAPSULE, EXTENDED RELEASE ORAL 2 TIMES DAILY
Qty: 60 CAPSULE | Refills: 0 | Status: SHIPPED
Start: 2021-04-14 | End: 2021-06-16

## 2021-03-17 RX ORDER — METHYLPHENIDATE HYDROCHLORIDE 20 MG/1
20 CAPSULE, EXTENDED RELEASE ORAL 2 TIMES DAILY
Qty: 60 CAPSULE | Refills: 0 | Status: SHIPPED
Start: 2021-05-12 | End: 2021-06-16

## 2021-03-17 RX ORDER — ALPRAZOLAM 0.25 MG/1
0.25 TABLET ORAL 2 TIMES DAILY PRN
Qty: 180 TABLET | Refills: 0 | Status: SHIPPED
Start: 2021-03-17 | End: 2021-06-16 | Stop reason: SDUPTHER

## 2021-03-17 RX ORDER — ESCITALOPRAM OXALATE 10 MG/1
15 TABLET ORAL DAILY
Qty: 135 TABLET | Refills: 1 | Status: SHIPPED
Start: 2021-03-17 | End: 2021-09-20

## 2021-03-17 RX ORDER — ESCITALOPRAM OXALATE 10 MG/1
10 TABLET ORAL DAILY
COMMUNITY
End: 2021-06-16

## 2021-03-17 ASSESSMENT — ENCOUNTER SYMPTOMS
VOMITING: 0
SHORTNESS OF BREATH: 0
ABDOMINAL PAIN: 0
WHEEZING: 0
BACK PAIN: 0
NAUSEA: 0
COUGH: 0
CONSTIPATION: 0
DIARRHEA: 0

## 2021-03-17 ASSESSMENT — PATIENT HEALTH QUESTIONNAIRE - PHQ9
SUM OF ALL RESPONSES TO PHQ QUESTIONS 1-9: 0
SUM OF ALL RESPONSES TO PHQ QUESTIONS 1-9: 0

## 2021-03-17 NOTE — PROGRESS NOTES
Prior to Visit   Medication Sig Dispense Refill    escitalopram (LEXAPRO) 10 MG tablet Take 10 mg by mouth daily      methylphenidate (RITALIN LA) 20 MG extended release capsule Take 20 mg by mouth 2 times daily. No current facility-administered medications on file prior to visit.         No Known Allergies    Past Medical History:   Diagnosis Date    ADHD (attention deficit hyperactivity disorder)     Anxiety      Past Surgical History:   Procedure Laterality Date    WISDOM TOOTH EXTRACTION       Family History   Problem Relation Age of Onset    High Blood Pressure Father     Cancer Father         colon @ 46    Heart Disease Maternal Grandfather     Heart Attack Paternal Grandfather      Social History     Socioeconomic History    Marital status:      Spouse name: Not on file    Number of children: Not on file    Years of education: Not on file    Highest education level: Not on file   Occupational History    Not on file   Social Needs    Financial resource strain: Not on file    Food insecurity     Worry: Not on file     Inability: Not on file    Transportation needs     Medical: Not on file     Non-medical: Not on file   Tobacco Use    Smoking status: Former Smoker     Packs/day: 1.00     Years: 10.00     Pack years: 10.00     Types: Cigarettes     Quit date: 7/12/2010     Years since quitting: 10.6    Smokeless tobacco: Never Used   Substance and Sexual Activity    Alcohol use: No    Drug use: No    Sexual activity: Not on file   Lifestyle    Physical activity     Days per week: Not on file     Minutes per session: Not on file    Stress: Not on file   Relationships    Social connections     Talks on phone: Not on file     Gets together: Not on file     Attends [employer] service: Not on file     Active member of club or organization: Not on file     Attends meetings of clubs or organizations: Not on file     Relationship status: Not on file    Intimate partner violence Fear of current or ex partner: Not on file     Emotionally abused: Not on file     Physically abused: Not on file     Forced sexual activity: Not on file   Other Topics Concern    Not on file   Social History Narrative    Not on file       Vitals:    03/17/21 0833   BP: 122/74   Pulse: 78   Temp: 97.5 °F (36.4 °C)   SpO2: 98%   Weight: 172 lb (78 kg)   Height: 5' 10\" (1.778 m)       Physical Exam:  Physical Exam  Vitals signs and nursing note reviewed. Constitutional:       General: He is not in acute distress. Appearance: Normal appearance. He is well-developed and normal weight. He is not ill-appearing. HENT:      Head: Normocephalic and atraumatic. Right Ear: Hearing and external ear normal.      Left Ear: Hearing and external ear normal.      Nose:      Comments: Patient wearing mask  Eyes:      General: Lids are normal. No scleral icterus. Right eye: No discharge. Left eye: No discharge. Extraocular Movements: Extraocular movements intact. Conjunctiva/sclera: Conjunctivae normal.   Neck:      Musculoskeletal: Normal range of motion and neck supple. Thyroid: No thyromegaly. Cardiovascular:      Rate and Rhythm: Normal rate and regular rhythm. Heart sounds: Normal heart sounds. No murmur. Pulmonary:      Effort: Pulmonary effort is normal. No respiratory distress. Breath sounds: Normal breath sounds. No wheezing. Musculoskeletal: Normal range of motion. General: No tenderness or deformity. Right lower leg: No edema. Left lower leg: No edema. Lymphadenopathy:      Cervical: No cervical adenopathy. Skin:     General: Skin is warm and dry. Findings: No rash. Neurological:      General: No focal deficit present. Mental Status: He is alert and oriented to person, place, and time.       Gait: Gait normal.   Psychiatric:         Mood and Affect: Mood and affect normal.         Speech: Speech normal.         Behavior: Behavior normal.         Thought Content: Thought content normal.         Labs:  CBC with Differential:    Lab Results   Component Value Date    WBC 5.0 12/04/2020    RBC 5.47 12/04/2020    HGB 16.1 12/04/2020    HCT 48.2 12/04/2020     12/04/2020    MCV 88.1 12/04/2020    MCH 29.4 12/04/2020    MCHC 33.4 12/04/2020    RDW 12.5 12/04/2020    LYMPHOPCT 27.9 12/04/2020    MONOPCT 5.8 12/04/2020    BASOPCT 0.4 12/04/2020    MONOSABS 0.29 12/04/2020    LYMPHSABS 1.39 12/04/2020    EOSABS 0.05 12/04/2020    BASOSABS 0.02 12/04/2020     CMP:    Lab Results   Component Value Date     12/04/2020    K 4.2 12/04/2020     12/04/2020    CO2 27 12/04/2020    BUN 14 12/04/2020    CREATININE 1.0 12/04/2020    GFRAA >60 12/04/2020    LABGLOM >60 12/04/2020    GLUCOSE 93 12/04/2020    PROT 8.3 12/04/2020    LABALBU 4.7 12/04/2020    CALCIUM 10.0 12/04/2020    BILITOT 1.0 12/04/2020    ALKPHOS 70 12/04/2020    AST 23 12/04/2020    ALT 14 12/04/2020     HgBA1c:  No results found for: LABA1C  Microalbumen/Creatinine ratio:  No components found for: RUCREAT  FLP:    Lab Results   Component Value Date    TRIG 77 12/04/2020    HDL 50 12/04/2020    LDLCALC 109 12/04/2020    LABVLDL 15 12/04/2020     TSH:    Lab Results   Component Value Date    TSH 2.610 12/04/2020          Assessment and Plan:  Rosa Isela Caputo was seen today for anxiety and adhd. Diagnoses and all orders for this visit:    ADHD, predominantly inattentive type  -     methylphenidate (RITALIN LA) 20 MG extended release capsule; Take 1 capsule by mouth 2 times daily for 30 days. -     methylphenidate (RITALIN LA) 20 MG extended release capsule; Take 1 capsule by mouth 2 times daily for 30 days. -     methylphenidate (RITALIN LA) 20 MG extended release capsule; Take 1 capsule by mouth 2 times daily for 30 days. Generalized anxiety disorder  -     escitalopram (LEXAPRO) 10 MG tablet; Take 1.5 tablets by mouth daily  -     ALPRAZolam (XANAX) 0.25 MG tablet;  Take 1 tablet by mouth 2 times daily as needed for Sleep for up to 90 days. OARRS reviewed and is appropriate. Patient doing well overall. Exercising routinely-rides bike up to 80 miles last week. Medications refilled      Return in about 3 months (around 6/17/2021) for Medication recheck.       Seen By:  Lashonda Cortés DO

## 2021-04-05 ENCOUNTER — IMMUNIZATION (OUTPATIENT)
Dept: PRIMARY CARE CLINIC | Age: 36
End: 2021-04-05
Payer: COMMERCIAL

## 2021-04-05 PROCEDURE — 91301 COVID-19, MODERNA VACCINE 100MCG/0.5ML DOSE: CPT | Performed by: PHYSICIAN ASSISTANT

## 2021-04-05 PROCEDURE — 0011A COVID-19, MODERNA VACCINE 100MCG/0.5ML DOSE: CPT | Performed by: PHYSICIAN ASSISTANT

## 2021-05-03 ENCOUNTER — IMMUNIZATION (OUTPATIENT)
Dept: PRIMARY CARE CLINIC | Age: 36
End: 2021-05-03
Payer: COMMERCIAL

## 2021-05-03 PROCEDURE — 91301 COVID-19, MODERNA VACCINE 100MCG/0.5ML DOSE: CPT | Performed by: PHYSICIAN ASSISTANT

## 2021-05-03 PROCEDURE — 0012A COVID-19, MODERNA VACCINE 100MCG/0.5ML DOSE: CPT | Performed by: PHYSICIAN ASSISTANT

## 2021-06-16 ENCOUNTER — OFFICE VISIT (OUTPATIENT)
Dept: PRIMARY CARE CLINIC | Age: 36
End: 2021-06-16
Payer: COMMERCIAL

## 2021-06-16 VITALS
SYSTOLIC BLOOD PRESSURE: 128 MMHG | OXYGEN SATURATION: 98 % | TEMPERATURE: 97.3 F | BODY MASS INDEX: 24.2 KG/M2 | HEART RATE: 80 BPM | HEIGHT: 70 IN | DIASTOLIC BLOOD PRESSURE: 82 MMHG | WEIGHT: 169 LBS

## 2021-06-16 DIAGNOSIS — F90.0 ADHD, PREDOMINANTLY INATTENTIVE TYPE: ICD-10-CM

## 2021-06-16 DIAGNOSIS — F41.1 GENERALIZED ANXIETY DISORDER: ICD-10-CM

## 2021-06-16 PROCEDURE — 99213 OFFICE O/P EST LOW 20 MIN: CPT | Performed by: FAMILY MEDICINE

## 2021-06-16 RX ORDER — ALPRAZOLAM 0.25 MG/1
0.25 TABLET ORAL 2 TIMES DAILY PRN
Qty: 180 TABLET | Refills: 0 | Status: SHIPPED | OUTPATIENT
Start: 2021-06-16 | End: 2021-09-14

## 2021-06-16 RX ORDER — METHYLPHENIDATE HYDROCHLORIDE 20 MG/1
20 CAPSULE, EXTENDED RELEASE ORAL 2 TIMES DAILY
Qty: 60 CAPSULE | Refills: 0 | Status: SHIPPED
Start: 2021-06-16 | End: 2021-09-20

## 2021-06-16 RX ORDER — METHYLPHENIDATE HYDROCHLORIDE 20 MG/1
20 CAPSULE, EXTENDED RELEASE ORAL 2 TIMES DAILY
Qty: 60 CAPSULE | Refills: 0 | Status: SHIPPED
Start: 2021-07-14 | End: 2021-09-20

## 2021-06-16 RX ORDER — ALPRAZOLAM 0.25 MG/1
0.25 TABLET ORAL 2 TIMES DAILY
COMMUNITY

## 2021-06-16 RX ORDER — METHYLPHENIDATE HYDROCHLORIDE 20 MG/1
20 CAPSULE, EXTENDED RELEASE ORAL 2 TIMES DAILY
Qty: 60 CAPSULE | Refills: 0 | Status: SHIPPED
Start: 2021-08-11 | End: 2021-09-20

## 2021-06-16 ASSESSMENT — ENCOUNTER SYMPTOMS
WHEEZING: 0
NAUSEA: 0
COUGH: 0
ABDOMINAL PAIN: 0
DIARRHEA: 0
CONSTIPATION: 0
BACK PAIN: 0
SHORTNESS OF BREATH: 0
VOMITING: 0

## 2021-06-16 NOTE — PROGRESS NOTES
30 days. -     methylphenidate (RITALIN LA) 20 MG extended release capsule; Take 1 capsule by mouth 2 times daily for 30 days. OARRS reviewed and is appropriate. Patient doing well overall. Medications refilled      Return in about 3 months (around 9/16/2021) for Medication recheck.       Seen By:  Ezio Reich DO

## 2021-09-20 ENCOUNTER — OFFICE VISIT (OUTPATIENT)
Dept: PRIMARY CARE CLINIC | Age: 36
End: 2021-09-20
Payer: COMMERCIAL

## 2021-09-20 VITALS
OXYGEN SATURATION: 98 % | TEMPERATURE: 97.7 F | DIASTOLIC BLOOD PRESSURE: 80 MMHG | BODY MASS INDEX: 23.77 KG/M2 | HEART RATE: 70 BPM | SYSTOLIC BLOOD PRESSURE: 122 MMHG | WEIGHT: 166 LBS | HEIGHT: 70 IN

## 2021-09-20 DIAGNOSIS — F41.1 GENERALIZED ANXIETY DISORDER: ICD-10-CM

## 2021-09-20 DIAGNOSIS — E55.9 VITAMIN D INSUFFICIENCY: ICD-10-CM

## 2021-09-20 DIAGNOSIS — F90.0 ADHD, PREDOMINANTLY INATTENTIVE TYPE: Primary | ICD-10-CM

## 2021-09-20 DIAGNOSIS — Z13.6 SCREENING FOR CARDIOVASCULAR CONDITION: ICD-10-CM

## 2021-09-20 DIAGNOSIS — L30.1 DYSHIDROTIC ECZEMA: ICD-10-CM

## 2021-09-20 PROCEDURE — 99214 OFFICE O/P EST MOD 30 MIN: CPT | Performed by: FAMILY MEDICINE

## 2021-09-20 RX ORDER — ESCITALOPRAM OXALATE 10 MG/1
15 TABLET ORAL DAILY
COMMUNITY
End: 2022-03-21

## 2021-09-20 RX ORDER — METHYLPHENIDATE HYDROCHLORIDE 20 MG/1
20 CAPSULE, EXTENDED RELEASE ORAL 2 TIMES DAILY
Qty: 60 CAPSULE | Refills: 0 | Status: SHIPPED
Start: 2021-10-18 | End: 2021-12-20

## 2021-09-20 RX ORDER — TRIAMCINOLONE ACETONIDE 5 MG/G
CREAM TOPICAL
Qty: 15 G | Refills: 5 | Status: SHIPPED
Start: 2021-09-20 | End: 2022-06-21 | Stop reason: SDUPTHER

## 2021-09-20 RX ORDER — METHYLPHENIDATE HYDROCHLORIDE 20 MG/1
20 CAPSULE, EXTENDED RELEASE ORAL 2 TIMES DAILY
Qty: 60 CAPSULE | Refills: 0 | Status: SHIPPED
Start: 2021-11-15 | End: 2021-12-20

## 2021-09-20 RX ORDER — ALPRAZOLAM 0.25 MG/1
0.25 TABLET ORAL 2 TIMES DAILY PRN
Qty: 180 TABLET | Refills: 0 | Status: SHIPPED
Start: 2021-09-20 | End: 2021-12-20 | Stop reason: SDUPTHER

## 2021-09-20 RX ORDER — METHYLPHENIDATE HYDROCHLORIDE 20 MG/1
20 CAPSULE, EXTENDED RELEASE ORAL 2 TIMES DAILY
Qty: 60 CAPSULE | Refills: 0 | Status: SHIPPED
Start: 2021-09-20 | End: 2021-12-20

## 2021-09-20 RX ORDER — ESCITALOPRAM OXALATE 10 MG/1
15 TABLET ORAL DAILY
Qty: 135 TABLET | Refills: 1 | Status: SHIPPED
Start: 2021-09-20 | End: 2021-12-20

## 2021-09-20 ASSESSMENT — ENCOUNTER SYMPTOMS
NAUSEA: 0
CONSTIPATION: 0
BACK PAIN: 0
ABDOMINAL PAIN: 0
DIARRHEA: 0
VOMITING: 0
WHEEZING: 0
COUGH: 0
SHORTNESS OF BREATH: 0

## 2021-09-20 NOTE — PROGRESS NOTES
21  [facility] :  Sex: male  Age: 39 y.o. Chief Complaint   Patient presents with    ADHD    Anxiety    Tinea Pedis     bilateral foot     HPI:  39 y.o. male patient presents today for 3 month(s) follow up of chronic medical conditions, medication refills and FBW. Patient's chart, medical, surgical and medication history all reviewed. ADHD  Patient presents for follow up of ADHD. He has been on medication for ADHD for many year(s). His current medication is Ritalin LA- 20 mg BID. Patient has been on current medication for many year(s). Side effects of medications include None. compliant all of the time. Symptoms that have improved include impulsivity, inability to follow directions and inattention. Anxiety  Patient complains of evaluation of anxiety disorder and panic attacks. He has the following anxiety symptoms: difficulty concentrating, feelings of losing control, irritable, racing thoughts. Onset of symptoms was approximately several years ago, stable since that time. He denies current suicidal and homicidal ideation. Previous treatment includes Lexapro and Xanax and no therapy. He complains of the following side effects from the treatment: none. Patient states that he hasn't had a panic attack in a long time. ROS:  Review of Systems   Constitutional: Negative for chills, fatigue and fever. Respiratory: Negative for cough, shortness of breath and wheezing. Cardiovascular: Negative for chest pain and palpitations. Gastrointestinal: Negative for abdominal pain, constipation, diarrhea, nausea and vomiting. Musculoskeletal: Negative for arthralgias and back pain. Skin: Negative for rash. Neurological: Negative for dizziness and headaches. Psychiatric/Behavioral: Positive for decreased concentration. Negative for dysphoric mood. The patient is nervous/anxious and is hyperactive. All other systems reviewed and are negative.        Current Outpatient Medications on File Prior to Visit   Medication Sig Dispense Refill    escitalopram (LEXAPRO) 10 MG tablet Take 15 mg by mouth daily      ALPRAZolam (XANAX) 0.25 MG tablet Take 0.25 mg by mouth 2 times daily.  methylphenidate (RITALIN LA) 20 MG extended release capsule Take 20 mg by mouth 2 times daily. No current facility-administered medications on file prior to visit. No Known Allergies    Past Medical History:   Diagnosis Date    ADHD (attention deficit hyperactivity disorder)     Anxiety      Past Surgical History:   Procedure Laterality Date    WISDOM TOOTH EXTRACTION       Family History   Problem Relation Age of Onset    High Blood Pressure Father     Cancer Father         colon @ 46    Heart Disease Maternal Grandfather     Heart Attack Paternal Grandfather      Social History     Socioeconomic History    Marital status:      Spouse name: Not on file    Number of children: Not on file    Years of education: Not on file    Highest education level: Not on file   Occupational History    Not on file   Tobacco Use    Smoking status: Former Smoker     Packs/day: 1.00     Years: 10.00     Pack years: 10.00     Types: Cigarettes     Quit date: 2010     Years since quittin.2    Smokeless tobacco: Never Used   Substance and Sexual Activity    Alcohol use: No    Drug use: No    Sexual activity: Not on file   Other Topics Concern    Not on file   Social History Narrative    Not on file     Social Determinants of Health     Financial Resource Strain:     Difficulty of Paying Living Expenses:    Food Insecurity:     Worried About Running Out of Food in the Last Year:     Ran Out of Food in the Last Year:    Transportation Needs:     Lack of Transportation (Medical):      Lack of Transportation (Non-Medical):    Physical Activity:     Days of Exercise per Week:     Minutes of Exercise per Session:    Stress:     Feeling of Stress :    Social Connections:     Frequency of Communication with Friends and Family:     Frequency of Social Gatherings with Friends and Family:     Attends Caodaism Services:     Active Member of Clubs or Organizations:     Attends Club or Organization Meetings:     Marital Status:    Intimate Partner Violence:     Fear of Current or Ex-Partner:     Emotionally Abused:     Physically Abused:     Sexually Abused:        Vitals:    09/20/21 0829   BP: 122/80   Pulse: 70   Temp: 97.7 °F (36.5 °C)   SpO2: 98%   Weight: 166 lb (75.3 kg)   Height: 5' 10\" (1.778 m)       Physical Exam:  Physical Exam  Vitals and nursing note reviewed. Constitutional:       General: He is not in acute distress. Appearance: Normal appearance. He is well-developed and normal weight. He is not ill-appearing. HENT:      Head: Normocephalic and atraumatic. Right Ear: Hearing and external ear normal.      Left Ear: Hearing and external ear normal.      Nose:      Comments: Patient wearing mask  Eyes:      General: Lids are normal. No scleral icterus. Right eye: No discharge. Left eye: No discharge. Extraocular Movements: Extraocular movements intact. Conjunctiva/sclera: Conjunctivae normal.   Neck:      Thyroid: No thyromegaly. Cardiovascular:      Rate and Rhythm: Normal rate and regular rhythm. Heart sounds: Normal heart sounds. No murmur heard. Pulmonary:      Effort: Pulmonary effort is normal. No respiratory distress. Breath sounds: Normal breath sounds. No wheezing. Musculoskeletal:         General: No tenderness or deformity. Normal range of motion. Cervical back: Normal range of motion and neck supple. Right lower leg: No edema. Left lower leg: No edema. Lymphadenopathy:      Cervical: No cervical adenopathy. Skin:     General: Skin is warm and dry. Findings: Rash (scattered erythematous vesicular type lesions on the foot and lower ankle. Excoriated.   has the appearance of dyshidrotic eczema) present. Neurological:      General: No focal deficit present. Mental Status: He is alert and oriented to person, place, and time. Gait: Gait normal.   Psychiatric:         Mood and Affect: Mood and affect normal.         Speech: Speech normal.         Behavior: Behavior normal.         Thought Content: Thought content normal.         Labs:  CBC with Differential:    Lab Results   Component Value Date    WBC 5.0 12/04/2020    RBC 5.47 12/04/2020    HGB 16.1 12/04/2020    HCT 48.2 12/04/2020     12/04/2020    MCV 88.1 12/04/2020    MCH 29.4 12/04/2020    MCHC 33.4 12/04/2020    RDW 12.5 12/04/2020    LYMPHOPCT 27.9 12/04/2020    MONOPCT 5.8 12/04/2020    BASOPCT 0.4 12/04/2020    MONOSABS 0.29 12/04/2020    LYMPHSABS 1.39 12/04/2020    EOSABS 0.05 12/04/2020    BASOSABS 0.02 12/04/2020     CMP:    Lab Results   Component Value Date     12/04/2020    K 4.2 12/04/2020     12/04/2020    CO2 27 12/04/2020    BUN 14 12/04/2020    CREATININE 1.0 12/04/2020    GFRAA >60 12/04/2020    LABGLOM >60 12/04/2020    GLUCOSE 93 12/04/2020    PROT 8.3 12/04/2020    LABALBU 4.7 12/04/2020    CALCIUM 10.0 12/04/2020    BILITOT 1.0 12/04/2020    ALKPHOS 70 12/04/2020    AST 23 12/04/2020    ALT 14 12/04/2020     HgBA1c:  No results found for: LABA1C  Microalbumen/Creatinine ratio:  No components found for: RUCREAT  FLP:    Lab Results   Component Value Date    TRIG 77 12/04/2020    HDL 50 12/04/2020    LDLCALC 109 12/04/2020    LABVLDL 15 12/04/2020     TSH:    Lab Results   Component Value Date    TSH 2.610 12/04/2020          Assessment and Plan:  Dennie Ask was seen today for adhd, anxiety and tinea pedis. Diagnoses and all orders for this visit:    ADHD, predominantly inattentive type  -     methylphenidate (RITALIN LA) 20 MG extended release capsule; Take 1 capsule by mouth 2 times daily for 30 days. -     methylphenidate (RITALIN LA) 20 MG extended release capsule;  Take 1 capsule

## 2021-12-14 DIAGNOSIS — Z13.6 SCREENING FOR CARDIOVASCULAR CONDITION: ICD-10-CM

## 2021-12-14 DIAGNOSIS — F41.1 GENERALIZED ANXIETY DISORDER: ICD-10-CM

## 2021-12-14 DIAGNOSIS — E55.9 VITAMIN D INSUFFICIENCY: ICD-10-CM

## 2021-12-14 LAB
ALBUMIN SERPL-MCNC: 4.5 G/DL (ref 3.5–5.2)
ALP BLD-CCNC: 69 U/L (ref 40–129)
ALT SERPL-CCNC: 10 U/L (ref 0–40)
ANION GAP SERPL CALCULATED.3IONS-SCNC: 14 MMOL/L (ref 7–16)
AST SERPL-CCNC: 26 U/L (ref 0–39)
BASOPHILS ABSOLUTE: 0.02 E9/L (ref 0–0.2)
BASOPHILS RELATIVE PERCENT: 0.3 % (ref 0–2)
BILIRUB SERPL-MCNC: 1.2 MG/DL (ref 0–1.2)
BILIRUBIN URINE: NEGATIVE
BLOOD, URINE: NEGATIVE
BUN BLDV-MCNC: 12 MG/DL (ref 6–20)
CALCIUM SERPL-MCNC: 10 MG/DL (ref 8.6–10.2)
CHLORIDE BLD-SCNC: 102 MMOL/L (ref 98–107)
CHOLESTEROL, TOTAL: 186 MG/DL (ref 0–199)
CLARITY: CLEAR
CO2: 24 MMOL/L (ref 22–29)
COLOR: YELLOW
CREAT SERPL-MCNC: 0.9 MG/DL (ref 0.7–1.2)
EOSINOPHILS ABSOLUTE: 0.16 E9/L (ref 0.05–0.5)
EOSINOPHILS RELATIVE PERCENT: 2.6 % (ref 0–6)
GFR AFRICAN AMERICAN: >60
GFR NON-AFRICAN AMERICAN: >60 ML/MIN/1.73
GLUCOSE BLD-MCNC: 91 MG/DL (ref 74–99)
GLUCOSE URINE: NEGATIVE MG/DL
HCT VFR BLD CALC: 47.6 % (ref 37–54)
HDLC SERPL-MCNC: 49 MG/DL
HEMOGLOBIN: 15.8 G/DL (ref 12.5–16.5)
IMMATURE GRANULOCYTES #: 0.01 E9/L
IMMATURE GRANULOCYTES %: 0.2 % (ref 0–5)
KETONES, URINE: NEGATIVE MG/DL
LDL CHOLESTEROL CALCULATED: 113 MG/DL (ref 0–99)
LEUKOCYTE ESTERASE, URINE: NEGATIVE
LYMPHOCYTES ABSOLUTE: 1.64 E9/L (ref 1.5–4)
LYMPHOCYTES RELATIVE PERCENT: 26.4 % (ref 20–42)
MCH RBC QN AUTO: 29 PG (ref 26–35)
MCHC RBC AUTO-ENTMCNC: 33.2 % (ref 32–34.5)
MCV RBC AUTO: 87.3 FL (ref 80–99.9)
MONOCYTES ABSOLUTE: 0.4 E9/L (ref 0.1–0.95)
MONOCYTES RELATIVE PERCENT: 6.4 % (ref 2–12)
NEUTROPHILS ABSOLUTE: 3.99 E9/L (ref 1.8–7.3)
NEUTROPHILS RELATIVE PERCENT: 64.1 % (ref 43–80)
NITRITE, URINE: NEGATIVE
PDW BLD-RTO: 12.5 FL (ref 11.5–15)
PH UA: 7 (ref 5–9)
PLATELET # BLD: 295 E9/L (ref 130–450)
PMV BLD AUTO: 10.5 FL (ref 7–12)
POTASSIUM SERPL-SCNC: 4.2 MMOL/L (ref 3.5–5)
PROTEIN UA: NEGATIVE MG/DL
RBC # BLD: 5.45 E12/L (ref 3.8–5.8)
SODIUM BLD-SCNC: 140 MMOL/L (ref 132–146)
SPECIFIC GRAVITY UA: <=1.005 (ref 1–1.03)
TOTAL PROTEIN: 8.4 G/DL (ref 6.4–8.3)
TRIGL SERPL-MCNC: 120 MG/DL (ref 0–149)
TSH SERPL DL<=0.05 MIU/L-ACNC: 3.47 UIU/ML (ref 0.27–4.2)
UROBILINOGEN, URINE: 0.2 E.U./DL
VITAMIN D 25-HYDROXY: 46 NG/ML (ref 30–100)
VLDLC SERPL CALC-MCNC: 24 MG/DL
WBC # BLD: 6.2 E9/L (ref 4.5–11.5)

## 2021-12-20 ENCOUNTER — OFFICE VISIT (OUTPATIENT)
Dept: PRIMARY CARE CLINIC | Age: 36
End: 2021-12-20
Payer: COMMERCIAL

## 2021-12-20 VITALS
BODY MASS INDEX: 24.48 KG/M2 | OXYGEN SATURATION: 96 % | DIASTOLIC BLOOD PRESSURE: 84 MMHG | SYSTOLIC BLOOD PRESSURE: 122 MMHG | HEART RATE: 79 BPM | TEMPERATURE: 97.7 F | WEIGHT: 171 LBS | HEIGHT: 70 IN

## 2021-12-20 DIAGNOSIS — F41.1 GENERALIZED ANXIETY DISORDER: ICD-10-CM

## 2021-12-20 DIAGNOSIS — F90.0 ADHD, PREDOMINANTLY INATTENTIVE TYPE: ICD-10-CM

## 2021-12-20 PROCEDURE — 99214 OFFICE O/P EST MOD 30 MIN: CPT | Performed by: FAMILY MEDICINE

## 2021-12-20 RX ORDER — ESCITALOPRAM OXALATE 10 MG/1
15 TABLET ORAL DAILY
Qty: 135 TABLET | Refills: 1 | Status: SHIPPED
Start: 2021-12-20 | End: 2022-03-21 | Stop reason: SDUPTHER

## 2021-12-20 RX ORDER — METHYLPHENIDATE HYDROCHLORIDE 20 MG/1
20 CAPSULE, EXTENDED RELEASE ORAL 2 TIMES DAILY
Qty: 60 CAPSULE | Refills: 0 | Status: SHIPPED
Start: 2022-01-17 | End: 2022-03-21

## 2021-12-20 RX ORDER — ALPRAZOLAM 0.25 MG/1
0.25 TABLET ORAL 2 TIMES DAILY PRN
Qty: 180 TABLET | Refills: 0 | Status: SHIPPED | OUTPATIENT
Start: 2021-12-20 | End: 2022-03-20

## 2021-12-20 RX ORDER — METHYLPHENIDATE HYDROCHLORIDE 20 MG/1
20 CAPSULE, EXTENDED RELEASE ORAL 2 TIMES DAILY
Qty: 60 CAPSULE | Refills: 0 | Status: SHIPPED
Start: 2021-12-20 | End: 2022-03-21

## 2021-12-20 RX ORDER — METHYLPHENIDATE HYDROCHLORIDE 20 MG/1
20 CAPSULE, EXTENDED RELEASE ORAL 2 TIMES DAILY
Qty: 60 CAPSULE | Refills: 0 | Status: SHIPPED
Start: 2022-02-14 | End: 2022-03-21

## 2021-12-20 ASSESSMENT — ENCOUNTER SYMPTOMS
WHEEZING: 0
BACK PAIN: 0
DIARRHEA: 0
SHORTNESS OF BREATH: 0
COUGH: 0
NAUSEA: 0
ABDOMINAL PAIN: 0
VOMITING: 0
CONSTIPATION: 0

## 2021-12-20 NOTE — PROGRESS NOTES
21  [facility] :  Sex: male  Age: 39 y.o. Chief Complaint   Patient presents with    ADHD    Anxiety    Medication Refill     HPI:  39 y.o. male patient presents today for 3 month(s) follow up of chronic medical conditions, medication refills and FBW results. Patient's chart, medical, surgical and medication history all reviewed. ADHD  Patient presents for follow up of ADHD. He has been on medication for ADHD for many year(s). His current medication is Ritalin LA- 20 mg BID. Patient has been on current medication for many year(s). Side effects of medications include None. compliant all of the time. Symptoms that have improved include impulsivity, inability to follow directions and inattention. Anxiety  Patient complains of evaluation of anxiety disorder and panic attacks. He has the following anxiety symptoms: difficulty concentrating, feelings of losing control, irritable, racing thoughts. Onset of symptoms was approximately several years ago, stable since that time. He denies current suicidal and homicidal ideation. Previous treatment includes Lexapro and Xanax and no therapy. He complains of the following side effects from the treatment: none. Patient states that he hasn't had a panic attack in a long time. ROS:  Review of Systems   Constitutional: Negative for chills, fatigue and fever. Respiratory: Negative for cough, shortness of breath and wheezing. Cardiovascular: Negative for chest pain and palpitations. Gastrointestinal: Negative for abdominal pain, constipation, diarrhea, nausea and vomiting. Musculoskeletal: Negative for arthralgias and back pain. Skin: Negative for rash. Neurological: Negative for dizziness and headaches. Psychiatric/Behavioral: Positive for decreased concentration. Negative for dysphoric mood. The patient is nervous/anxious and is hyperactive. All other systems reviewed and are negative.        Current Outpatient Medications on File Prior to Visit   Medication Sig Dispense Refill    escitalopram (LEXAPRO) 10 MG tablet Take 15 mg by mouth daily      triamcinolone (ARISTOCORT) 0.5 % cream Apply topically 2 times daily. 15 g 5    ALPRAZolam (XANAX) 0.25 MG tablet Take 0.25 mg by mouth 2 times daily.  methylphenidate (RITALIN LA) 20 MG extended release capsule Take 20 mg by mouth 2 times daily. No current facility-administered medications on file prior to visit. No Known Allergies    Past Medical History:   Diagnosis Date    ADHD (attention deficit hyperactivity disorder)     Anxiety      Past Surgical History:   Procedure Laterality Date    WISDOM TOOTH EXTRACTION       Family History   Problem Relation Age of Onset    High Blood Pressure Father     Cancer Father         colon @ 46    Heart Disease Maternal Grandfather     Heart Attack Paternal Grandfather      Social History     Socioeconomic History    Marital status:      Spouse name: Not on file    Number of children: Not on file    Years of education: Not on file    Highest education level: Not on file   Occupational History    Not on file   Tobacco Use    Smoking status: Former Smoker     Packs/day: 1.00     Years: 10.00     Pack years: 10.00     Types: Cigarettes     Quit date: 2010     Years since quittin.4    Smokeless tobacco: Never Used   Substance and Sexual Activity    Alcohol use: No    Drug use: No    Sexual activity: Not on file   Other Topics Concern    Not on file   Social History Narrative    Not on file     Social Determinants of Health     Financial Resource Strain:     Difficulty of Paying Living Expenses: Not on file   Food Insecurity:     Worried About Running Out of Food in the Last Year: Not on file    Tez of Food in the Last Year: Not on file   Transportation Needs:     Lack of Transportation (Medical): Not on file    Lack of Transportation (Non-Medical):  Not on file   Physical Activity:  Days of Exercise per Week: Not on file    Minutes of Exercise per Session: Not on file   Stress:     Feeling of Stress : Not on file   Social Connections:     Frequency of Communication with Friends and Family: Not on file    Frequency of Social Gatherings with Friends and Family: Not on file    Attends Adventism Services: Not on file    Active Member of Clubs or Organizations: Not on file    Attends Club or Organization Meetings: Not on file    Marital Status: Not on file   Intimate Partner Violence:     Fear of Current or Ex-Partner: Not on file    Emotionally Abused: Not on file    Physically Abused: Not on file    Sexually Abused: Not on file   Housing Stability:     Unable to Pay for Housing in the Last Year: Not on file    Number of [place] in the Last Year: Not on file    Unstable Housing in the Last Year: Not on file       Vitals:    12/20/21 0858   BP: 122/84   Pulse: 79   Temp: 97.7 °F (36.5 °C)   SpO2: 96%   Weight: 171 lb (77.6 kg)   Height: 5' 10\" (1.778 m)       Physical Exam:  Physical Exam  Vitals and nursing note reviewed. Constitutional:       General: He is not in acute distress. Appearance: Normal appearance. He is well-developed and normal weight. He is not ill-appearing. HENT:      Head: Normocephalic and atraumatic. Right Ear: Hearing and external ear normal.      Left Ear: Hearing and external ear normal.      Nose:      Comments: Patient wearing mask  Eyes:      General: Lids are normal. No scleral icterus. Right eye: No discharge. Left eye: No discharge. Extraocular Movements: Extraocular movements intact. Conjunctiva/sclera: Conjunctivae normal.   Neck:      Thyroid: No thyromegaly. Cardiovascular:      Rate and Rhythm: Normal rate and regular rhythm. Heart sounds: Normal heart sounds. No murmur heard. Pulmonary:      Effort: Pulmonary effort is normal. No respiratory distress.       Breath sounds: Normal breath sounds. No wheezing. Musculoskeletal:         General: No tenderness or deformity. Normal range of motion. Cervical back: Normal range of motion and neck supple. Right lower leg: No edema. Left lower leg: No edema. Lymphadenopathy:      Cervical: No cervical adenopathy. Skin:     General: Skin is warm and dry. Findings: No rash. Neurological:      General: No focal deficit present. Mental Status: He is alert and oriented to person, place, and time. Gait: Gait normal.   Psychiatric:         Mood and Affect: Mood and affect normal.         Speech: Speech normal.         Behavior: Behavior normal.         Thought Content:  Thought content normal.         Labs:  CBC with Differential:    Lab Results   Component Value Date    WBC 6.2 12/14/2021    RBC 5.45 12/14/2021    HGB 15.8 12/14/2021    HCT 47.6 12/14/2021     12/14/2021    MCV 87.3 12/14/2021    MCH 29.0 12/14/2021    MCHC 33.2 12/14/2021    RDW 12.5 12/14/2021    LYMPHOPCT 26.4 12/14/2021    MONOPCT 6.4 12/14/2021    BASOPCT 0.3 12/14/2021    MONOSABS 0.40 12/14/2021    LYMPHSABS 1.64 12/14/2021    EOSABS 0.16 12/14/2021    BASOSABS 0.02 12/14/2021     CMP:    Lab Results   Component Value Date     12/14/2021    K 4.2 12/14/2021     12/14/2021    CO2 24 12/14/2021    BUN 12 12/14/2021    CREATININE 0.9 12/14/2021    GFRAA >60 12/14/2021    LABGLOM >60 12/14/2021    GLUCOSE 91 12/14/2021    PROT 8.4 12/14/2021    LABALBU 4.5 12/14/2021    CALCIUM 10.0 12/14/2021    BILITOT 1.2 12/14/2021    ALKPHOS 69 12/14/2021    AST 26 12/14/2021    ALT 10 12/14/2021     HgBA1c:  No results found for: LABA1C  Microalbumen/Creatinine ratio:  No components found for: RUCREAT  FLP:    Lab Results   Component Value Date    TRIG 120 12/14/2021    HDL 49 12/14/2021    LDLCALC 113 12/14/2021    LABVLDL 24 12/14/2021     TSH:    Lab Results   Component Value Date    TSH 3.470 12/14/2021          Assessment and Plan:  Jenny Ridge was seen today for adhd, anxiety and medication refill. Diagnoses and all orders for this visit:    ADHD, predominantly inattentive type  -     methylphenidate (RITALIN LA) 20 MG extended release capsule; Take 1 capsule by mouth 2 times daily for 30 days. -     methylphenidate (RITALIN LA) 20 MG extended release capsule; Take 1 capsule by mouth 2 times daily for 30 days. -     methylphenidate (RITALIN LA) 20 MG extended release capsule; Take 1 capsule by mouth 2 times daily for 30 days. Generalized anxiety disorder  -     ALPRAZolam (XANAX) 0.25 MG tablet; Take 1 tablet by mouth 2 times daily as needed for Sleep or Anxiety for up to 90 days. -     escitalopram (LEXAPRO) 10 MG tablet; Take 1.5 tablets by mouth daily    OARRS reviewed and is appropriate. Labs reviewed in detail with patient. All well controlled. Return in about 3 months (around 3/20/2022), or if symptoms worsen or fail to improve, for Well visit.       Seen By:  Fernanda Araya, DO

## 2022-03-21 ENCOUNTER — OFFICE VISIT (OUTPATIENT)
Dept: PRIMARY CARE CLINIC | Age: 37
End: 2022-03-21
Payer: COMMERCIAL

## 2022-03-21 VITALS
HEART RATE: 75 BPM | TEMPERATURE: 97.1 F | DIASTOLIC BLOOD PRESSURE: 80 MMHG | BODY MASS INDEX: 25.22 KG/M2 | WEIGHT: 176.2 LBS | SYSTOLIC BLOOD PRESSURE: 112 MMHG | OXYGEN SATURATION: 99 % | HEIGHT: 70 IN

## 2022-03-21 DIAGNOSIS — Z00.01 ENCOUNTER FOR WELL ADULT EXAM WITH ABNORMAL FINDINGS: Primary | ICD-10-CM

## 2022-03-21 DIAGNOSIS — F41.0 PANIC DISORDER WITHOUT AGORAPHOBIA: ICD-10-CM

## 2022-03-21 DIAGNOSIS — F41.1 GENERALIZED ANXIETY DISORDER: ICD-10-CM

## 2022-03-21 DIAGNOSIS — F90.0 ADHD, PREDOMINANTLY INATTENTIVE TYPE: ICD-10-CM

## 2022-03-21 PROCEDURE — 99395 PREV VISIT EST AGE 18-39: CPT | Performed by: FAMILY MEDICINE

## 2022-03-21 RX ORDER — METHYLPHENIDATE HYDROCHLORIDE 20 MG/1
20 CAPSULE, EXTENDED RELEASE ORAL 2 TIMES DAILY
Qty: 60 CAPSULE | Refills: 0 | Status: SHIPPED
Start: 2022-05-16 | End: 2022-06-21

## 2022-03-21 RX ORDER — METHYLPHENIDATE HYDROCHLORIDE 20 MG/1
20 CAPSULE, EXTENDED RELEASE ORAL 2 TIMES DAILY
Qty: 60 CAPSULE | Refills: 0 | Status: SHIPPED
Start: 2022-04-18 | End: 2022-06-21

## 2022-03-21 RX ORDER — METHYLPHENIDATE HYDROCHLORIDE 20 MG/1
20 CAPSULE, EXTENDED RELEASE ORAL 2 TIMES DAILY
Qty: 60 CAPSULE | Refills: 0 | Status: SHIPPED
Start: 2022-03-21 | End: 2022-06-21 | Stop reason: SDUPTHER

## 2022-03-21 RX ORDER — ALPRAZOLAM 0.25 MG/1
0.25 TABLET ORAL 2 TIMES DAILY PRN
Qty: 180 TABLET | Refills: 0 | Status: SHIPPED
Start: 2022-03-21 | End: 2022-06-21 | Stop reason: SDUPTHER

## 2022-03-21 RX ORDER — ESCITALOPRAM OXALATE 10 MG/1
15 TABLET ORAL DAILY
Qty: 135 TABLET | Refills: 1 | Status: SHIPPED
Start: 2022-03-21 | End: 2022-09-20 | Stop reason: SDUPTHER

## 2022-03-21 ASSESSMENT — ENCOUNTER SYMPTOMS
COUGH: 0
CONSTIPATION: 0
DIARRHEA: 0
BACK PAIN: 0
ABDOMINAL PAIN: 0
SHORTNESS OF BREATH: 0
NAUSEA: 0
VOMITING: 0
WHEEZING: 0

## 2022-03-21 ASSESSMENT — PATIENT HEALTH QUESTIONNAIRE - PHQ9
1. LITTLE INTEREST OR PLEASURE IN DOING THINGS: 0
SUM OF ALL RESPONSES TO PHQ9 QUESTIONS 1 & 2: 0
SUM OF ALL RESPONSES TO PHQ QUESTIONS 1-9: 0
2. FEELING DOWN, DEPRESSED OR HOPELESS: 0

## 2022-03-21 NOTE — PROGRESS NOTES
3/21/22  Delroy Plate :  Sex: male  Age: 39 y.o. Chief Complaint   Patient presents with    Annual Exam     physical    Medication Refill     HPI:  39 y.o. male presents today for yearly physical and medication refills. Patient's chart, medical, surgical and medication history all reviewed. Well Adult Physical  Patient here for a physical exam.  The patient reports no problems. Do you take any herbs or supplements that were not prescribed by a doctor? no   Are you taking calcium supplements? no   Are you taking aspirin daily? no    Colonoscopy: No prior colonoscopy  Dental visit: Within last 6 mos  Vision check:  Glasses    Last time routine bloodwork was done: 2021    Immunization status: up to date and documented. Smoking status: never    Physical activity: frequently    ROS:  Review of Systems   Constitutional: Negative for chills, fatigue and fever. Respiratory: Negative for cough, shortness of breath and wheezing. Cardiovascular: Negative for chest pain and palpitations. Gastrointestinal: Negative for abdominal pain, constipation, diarrhea, nausea and vomiting. Musculoskeletal: Negative for arthralgias and back pain. Skin: Negative for rash. Neurological: Negative for dizziness and headaches. Psychiatric/Behavioral: Positive for decreased concentration. Negative for dysphoric mood. The patient is nervous/anxious and is hyperactive. All other systems reviewed and are negative. Current Outpatient Medications on File Prior to Visit   Medication Sig Dispense Refill    triamcinolone (ARISTOCORT) 0.5 % cream Apply topically 2 times daily. 15 g 5    ALPRAZolam (XANAX) 0.25 MG tablet Take 0.25 mg by mouth 2 times daily.  methylphenidate (RITALIN LA) 20 MG extended release capsule Take 20 mg by mouth 2 times daily. No current facility-administered medications on file prior to visit.        No Known Allergies    Past Medical History:   Diagnosis Date  ADHD (attention deficit hyperactivity disorder)     Anxiety      Past Surgical History:   Procedure Laterality Date    WISDOM TOOTH EXTRACTION       Family History   Problem Relation Age of Onset    Depression Mother         Anxiety and Panic as well.  High Blood Pressure Father     Cancer Father         Diagnosed at 46, stage 4 colon. Illiostomy was reversed. Had 2 liver resections. Currently in remission.  Colon Cancer Father     Heart Disease Father         Hole in heart, found during cancer treatment as it caused a stroke when giving IVs.    High Cholesterol Father     Stroke Father         Due to hole in heart. Hes also got an aneurysm. Fun times.  Heart Disease Maternal Grandfather     Diabetes Maternal Grandfather         Smoked as well for 50 years, worked in [address] [place] [address].  Heart Attack Maternal Grandfather         Had open heart surgery in . Survived that but  of cardiac arrest 1 month later.  Heart Attack Paternal Grandfather     Alcohol Abuse Paternal Uncle          2021 at 58, drank nightly for 40 years. Was in liver failure and  quickly , was full of malignant tumors too.  Early Death Paternal Uncle         Was 58.  Alcohol Abuse Maternal Grandmother         Never met her, she was a drunk who abandoned my mom in 59. She  due to liver failure from drinking.  Early Death Maternal Grandmother         Was 61.  Asthma Sister     Mental Retardation Paternal Aunt         Functions at 3year old level, can communicate some.  Prostate Cancer Maternal Cousin          of prostate cancer in  in his 62s.  Other Paternal Aunt         Helens sister. Also Alzheimers. Paternal Lion Menjivar also had Alzheimer's.  Other Paternal Grandmother         Alzheimers.      Social History     Socioeconomic History    Marital status:      Spouse name: Not on file    Number of children: Not on file    Years of education: Not on file    Highest education level: Not on file   Occupational History    Not on file   Tobacco Use    Smoking status: Former Smoker     Packs/day: 1.00     Years: 10.00     Pack years: 10.00     Types: Cigarettes     Start date: 1999     Quit date: 1/10/2010     Years since quittin.2    Smokeless tobacco: Never Used    Tobacco comment: Smoked 15 per day from 14 to 24. Quit 1/1/10 and havent had one since. Substance and Sexual Activity    Alcohol use: Yes     Alcohol/week: 3.0 standard drinks     Types: 3 Cans of beer per week    Drug use: Never    Sexual activity: Yes     Partners: Female     Comment: Only been with wife since 2006   Other Topics Concern    Not on file   Social History Narrative    Not on file     Social Determinants of Health     Financial Resource Strain:     Difficulty of Paying Living Expenses: Not on file   Food Insecurity:     Worried About Running Out of Food in the Last Year: Not on file    Tez of Food in the Last Year: Not on file   Transportation Needs:     Lack of Transportation (Medical): Not on file    Lack of Transportation (Non-Medical):  Not on file   Physical Activity:     Days of Exercise per Week: Not on file    Minutes of Exercise per Session: Not on file   Stress:     Feeling of Stress : Not on file   Social Connections:     Frequency of Communication with Friends and Family: Not on file    Frequency of Social Gatherings with Friends and Family: Not on file    Attends Spiritism Services: Not on file    Active Member of Clubs or Organizations: Not on file    Attends Club or Organization Meetings: Not on file    Marital Status: Not on file   Intimate Partner Violence:     Fear of Current or Ex-Partner: Not on file    Emotionally Abused: Not on file    Physically Abused: Not on file    Sexually Abused: Not on file   Housing Stability:     Unable to Pay for Housing in the Last Year: Not on file    Number of [place] in the Last Year: Not on file    Unstable Housing in the Last Year: Not on file       Vitals:    03/21/22 0834   BP: 112/80   Pulse: 75   Temp: 97.1 °F (36.2 °C)   SpO2: 99%   Weight: 176 lb 3.2 oz (79.9 kg)   Height: 5' 10\" (1.778 m)       Physical Exam:  Physical Exam  Vitals and nursing note reviewed. Constitutional:       General: He is not in acute distress. Appearance: Normal appearance. He is well-developed and normal weight. He is not ill-appearing. HENT:      Head: Normocephalic and atraumatic. Right Ear: Hearing and external ear normal.      Left Ear: Hearing and external ear normal.      Nose:      Comments: Patient wearing mask  Eyes:      General: Lids are normal. No scleral icterus. Right eye: No discharge. Left eye: No discharge. Extraocular Movements: Extraocular movements intact. Conjunctiva/sclera: Conjunctivae normal.   Neck:      Thyroid: No thyromegaly. Cardiovascular:      Rate and Rhythm: Normal rate and regular rhythm. Heart sounds: Normal heart sounds. No murmur heard. Pulmonary:      Effort: Pulmonary effort is normal. No respiratory distress. Breath sounds: Normal breath sounds. No wheezing. Musculoskeletal:         General: No tenderness or deformity. Normal range of motion. Cervical back: Normal range of motion and neck supple. Right lower leg: No edema. Left lower leg: No edema. Lymphadenopathy:      Cervical: No cervical adenopathy. Skin:     General: Skin is warm and dry. Findings: No rash. Neurological:      General: No focal deficit present. Mental Status: He is alert and oriented to person, place, and time. Gait: Gait normal.   Psychiatric:         Mood and Affect: Mood and affect normal.         Speech: Speech normal.         Behavior: Behavior normal.         Thought Content:  Thought content normal.         Labs:  CBC with Differential:    Lab Results   Component Value Date    WBC 6.2 12/14/2021    RBC 5.45 for up to 90 days. Panic disorder without agoraphobia  -     ALPRAZolam (XANAX) 0.25 MG tablet; Take 1 tablet by mouth 2 times daily as needed for Sleep or Anxiety for up to 90 days. ADHD, predominantly inattentive type  -     methylphenidate (RITALIN LA) 20 MG extended release capsule; Take 1 capsule by mouth 2 times daily for 30 days. -     methylphenidate (RITALIN LA) 20 MG extended release capsule; Take 1 capsule by mouth 2 times daily for 30 days. -     methylphenidate (RITALIN LA) 20 MG extended release capsule; Take 1 capsule by mouth 2 times daily for 30 days. OARRS appropriate. No signs of abuse or diversion. No side effects. Return in about 3 months (around 6/21/2022), or if symptoms worsen or fail to improve, for Medication recheck.       Seen By:  Niyah Mclaughlin, DO

## 2022-06-21 ENCOUNTER — OFFICE VISIT (OUTPATIENT)
Dept: PRIMARY CARE CLINIC | Age: 37
End: 2022-06-21
Payer: COMMERCIAL

## 2022-06-21 VITALS
WEIGHT: 174.2 LBS | OXYGEN SATURATION: 97 % | DIASTOLIC BLOOD PRESSURE: 84 MMHG | SYSTOLIC BLOOD PRESSURE: 132 MMHG | HEART RATE: 67 BPM | BODY MASS INDEX: 24.94 KG/M2 | TEMPERATURE: 97.3 F | HEIGHT: 70 IN

## 2022-06-21 DIAGNOSIS — F90.0 ADHD, PREDOMINANTLY INATTENTIVE TYPE: ICD-10-CM

## 2022-06-21 DIAGNOSIS — F41.1 GENERALIZED ANXIETY DISORDER: ICD-10-CM

## 2022-06-21 DIAGNOSIS — F41.0 PANIC DISORDER WITHOUT AGORAPHOBIA: ICD-10-CM

## 2022-06-21 DIAGNOSIS — L30.1 DYSHIDROTIC ECZEMA: ICD-10-CM

## 2022-06-21 PROCEDURE — 99213 OFFICE O/P EST LOW 20 MIN: CPT | Performed by: FAMILY MEDICINE

## 2022-06-21 RX ORDER — ALPRAZOLAM 0.25 MG/1
0.25 TABLET ORAL 2 TIMES DAILY PRN
Qty: 180 TABLET | Refills: 0 | Status: SHIPPED
Start: 2022-06-21 | End: 2022-09-20 | Stop reason: SDUPTHER

## 2022-06-21 RX ORDER — METHYLPHENIDATE HYDROCHLORIDE 20 MG/1
20 CAPSULE, EXTENDED RELEASE ORAL 2 TIMES DAILY
Qty: 60 CAPSULE | Refills: 0 | Status: SHIPPED
Start: 2022-06-21 | End: 2022-09-20 | Stop reason: SDUPTHER

## 2022-06-21 RX ORDER — METHYLPHENIDATE HYDROCHLORIDE 20 MG/1
20 CAPSULE, EXTENDED RELEASE ORAL 2 TIMES DAILY
Qty: 60 CAPSULE | Refills: 0 | Status: SHIPPED
Start: 2022-07-19 | End: 2022-09-20 | Stop reason: SDUPTHER

## 2022-06-21 RX ORDER — TRIAMCINOLONE ACETONIDE 5 MG/G
CREAM TOPICAL
Qty: 15 G | Refills: 5 | Status: SHIPPED | OUTPATIENT
Start: 2022-06-21

## 2022-06-21 RX ORDER — METHYLPHENIDATE HYDROCHLORIDE 20 MG/1
20 CAPSULE, EXTENDED RELEASE ORAL 2 TIMES DAILY
Qty: 60 CAPSULE | Refills: 0 | Status: SHIPPED
Start: 2022-08-16 | End: 2022-09-20 | Stop reason: SDUPTHER

## 2022-06-21 ASSESSMENT — ENCOUNTER SYMPTOMS
VOMITING: 0
BACK PAIN: 0
CONSTIPATION: 0
COUGH: 0
NAUSEA: 0
WHEEZING: 0
DIARRHEA: 0
ABDOMINAL PAIN: 0
SHORTNESS OF BREATH: 0

## 2022-06-21 NOTE — PROGRESS NOTES
22  Delroy Means :  Sex: male  Age: 40 y.o. Chief Complaint   Patient presents with    ADHD    Medication Refill     HPI:  40 y.o. male patient presents today for 3 month(s) follow up of chronic medical conditions, medication refills. Patient's chart, medical, surgical and medication history all reviewed. ADHD  Patient presents for follow up of ADHD. He has been on medication for ADHD for many year(s). His current medication is Ritalin LA- 20 mg BID. Patient has been on current medication for many year(s). Side effects of medications include None. compliant all of the time. Symptoms that have improved include impulsivity, inability to follow directions and inattention. Anxiety  Patient complains of evaluation of anxiety disorder and panic attacks. He has the following anxiety symptoms: difficulty concentrating, feelings of losing control, irritable, racing thoughts. Onset of symptoms was approximately several years ago, stable since that time. He denies current suicidal and homicidal ideation. Previous treatment includes Lexapro and Xanax and no therapy. He complains of the following side effects from the treatment: none. Patient states that he hasn't had a panic attack in a long time. ROS:  Review of Systems   Constitutional: Negative for chills, fatigue and fever. Respiratory: Negative for cough, shortness of breath and wheezing. Cardiovascular: Negative for chest pain and palpitations. Gastrointestinal: Negative for abdominal pain, constipation, diarrhea, nausea and vomiting. Musculoskeletal: Negative for arthralgias and back pain. Skin: Negative for rash. Neurological: Negative for dizziness and headaches. Psychiatric/Behavioral: Positive for decreased concentration. Negative for dysphoric mood. The patient is nervous/anxious and is hyperactive. All other systems reviewed and are negative.        Current Outpatient Medications on File Prior to Visit Medication Sig Dispense Refill    escitalopram (LEXAPRO) 10 MG tablet Take 1.5 tablets by mouth daily 135 tablet 1    ALPRAZolam (XANAX) 0.25 MG tablet Take 0.25 mg by mouth 2 times daily. No current facility-administered medications on file prior to visit. No Known Allergies    Past Medical History:   Diagnosis Date    ADHD (attention deficit hyperactivity disorder)     Anxiety      Past Surgical History:   Procedure Laterality Date    WISDOM TOOTH EXTRACTION       Family History   Problem Relation Age of Onset    Depression Mother         Anxiety and Panic as well.  High Blood Pressure Father     Cancer Father         Diagnosed at 46, stage 4 colon. Illiostomy was reversed. Had 2 liver resections. Currently in remission.  Colon Cancer Father     Heart Disease Father         Hole in heart, found during cancer treatment as it caused a stroke when giving IVs.    High Cholesterol Father     Stroke Father         Due to hole in heart. Hes also got an aneurysm. Fun times.  Heart Disease Maternal Grandfather     Diabetes Maternal Grandfather         Smoked as well for 50 years, worked in [address] [place] [address].  Heart Attack Maternal Grandfather         Had open heart surgery in . Survived that but  of cardiac arrest 1 month later.  Heart Attack Paternal Grandfather     Alcohol Abuse Paternal Uncle          2021 at 58, drank nightly for 40 years. Was in liver failure and  quickly , was full of malignant tumors too.  Early Death Paternal Uncle         Was 58.  Alcohol Abuse Maternal Grandmother         Never met her, she was a drunk who abandoned my mom in 59. She  due to liver failure from drinking.  Early Death Maternal Grandmother         Was 61.  Asthma Sister     Mental Retardation Paternal Aunt         Functions at 3year old level, can communicate some.  Prostate Cancer Maternal Cousin          of prostate cancer in  in his 62s.     Other Paternal Aunt         Helens sister. Also Alzheimers. Paternal Scout Maya also had Alzheimer's.  Other Paternal Grandmother         Alzheimers. Social History     Socioeconomic History    Marital status:      Spouse name: Not on file    Number of children: Not on file    Years of education: Not on file    Highest education level: Not on file   Occupational History    Not on file   Tobacco Use    Smoking status: Former Smoker     Packs/day: 1.00     Years: 10.00     Pack years: 10.00     Types: Cigarettes     Start date: 1999     Quit date: 1/10/2010     Years since quittin.4    Smokeless tobacco: Never Used    Tobacco comment: Smoked 15 per day from 14 to 24. Quit 1/1/10 and havent had one since. Substance and Sexual Activity    Alcohol use: Yes     Alcohol/week: 3.0 standard drinks     Types: 3 Cans of beer per week    Drug use: Never    Sexual activity: Yes     Partners: Female     Comment: Only been with wife since 2006   Other Topics Concern    Not on file   Social History Narrative    Not on file     Social Determinants of Health     Financial Resource Strain:     Difficulty of Paying Living Expenses: Not on file   Food Insecurity:     Worried About Running Out of Food in the Last Year: Not on file    Tez of Food in the Last Year: Not on file   Transportation Needs:     Lack of Transportation (Medical): Not on file    Lack of Transportation (Non-Medical):  Not on file   Physical Activity:     Days of Exercise per Week: Not on file    Minutes of Exercise per Session: Not on file   Stress:     Feeling of Stress : Not on file   Social Connections:     Frequency of Communication with Friends and Family: Not on file    Frequency of Social Gatherings with Friends and Family: Not on file    Attends Rastafarian Services: Not on file    Active Member of Clubs or Organizations: Not on file    Attends Club or Organization Meetings: Not on file    Marital Status: Not on file   Intimate Partner Violence:     Fear of Current or Ex-Partner: Not on file    Emotionally Abused: Not on file    Physically Abused: Not on file    Sexually Abused: Not on file   Housing Stability:     Unable to Pay for Housing in the Last Year: Not on file    Number of [place] in the Last Year: Not on file    Unstable Housing in the Last Year: Not on file       Vitals:    06/21/22 0831   BP: 132/84   Pulse: 67   Temp: 97.3 °F (36.3 °C)   SpO2: 97%   Weight: 174 lb 3.2 oz (79 kg)   Height: 5' 10\" (1.778 m)       Physical Exam:  Physical Exam  Vitals and nursing note reviewed. Constitutional:       General: He is not in acute distress. Appearance: Normal appearance. He is well-developed and normal weight. He is not ill-appearing. HENT:      Head: Normocephalic and atraumatic. Right Ear: Hearing and external ear normal.      Left Ear: Hearing and external ear normal.      Nose:      Comments: Patient wearing mask  Eyes:      General: Lids are normal. No scleral icterus. Right eye: No discharge. Left eye: No discharge. Extraocular Movements: Extraocular movements intact. Conjunctiva/sclera: Conjunctivae normal.   Neck:      Thyroid: No thyromegaly. Cardiovascular:      Rate and Rhythm: Normal rate and regular rhythm. Heart sounds: Normal heart sounds. No murmur heard. Pulmonary:      Effort: Pulmonary effort is normal. No respiratory distress. Breath sounds: Normal breath sounds. No wheezing. Musculoskeletal:         General: No tenderness or deformity. Normal range of motion. Cervical back: Normal range of motion and neck supple. Right lower leg: No edema. Left lower leg: No edema. Lymphadenopathy:      Cervical: No cervical adenopathy. Skin:     General: Skin is warm and dry. Findings: No rash. Neurological:      General: No focal deficit present.       Mental Status: He is alert and oriented to person, place, and time. Gait: Gait normal.   Psychiatric:         Mood and Affect: Mood and affect normal.         Speech: Speech normal.         Behavior: Behavior normal.         Thought Content: Thought content normal.         Labs:  CBC with Differential:    Lab Results   Component Value Date    WBC 6.2 12/14/2021    RBC 5.45 12/14/2021    HGB 15.8 12/14/2021    HCT 47.6 12/14/2021     12/14/2021    MCV 87.3 12/14/2021    MCH 29.0 12/14/2021    MCHC 33.2 12/14/2021    RDW 12.5 12/14/2021    LYMPHOPCT 26.4 12/14/2021    MONOPCT 6.4 12/14/2021    BASOPCT 0.3 12/14/2021    MONOSABS 0.40 12/14/2021    LYMPHSABS 1.64 12/14/2021    EOSABS 0.16 12/14/2021    BASOSABS 0.02 12/14/2021     CMP:    Lab Results   Component Value Date     12/14/2021    K 4.2 12/14/2021     12/14/2021    CO2 24 12/14/2021    BUN 12 12/14/2021    CREATININE 0.9 12/14/2021    GFRAA >60 12/14/2021    LABGLOM >60 12/14/2021    GLUCOSE 91 12/14/2021    PROT 8.4 12/14/2021    LABALBU 4.5 12/14/2021    CALCIUM 10.0 12/14/2021    BILITOT 1.2 12/14/2021    ALKPHOS 69 12/14/2021    AST 26 12/14/2021    ALT 10 12/14/2021     HgBA1c:  No results found for: LABA1C  Microalbumen/Creatinine ratio:  No components found for: RUCREAT  FLP:    Lab Results   Component Value Date    TRIG 120 12/14/2021    HDL 49 12/14/2021    LDLCALC 113 12/14/2021    LABVLDL 24 12/14/2021     TSH:    Lab Results   Component Value Date    TSH 3.470 12/14/2021          Assessment and Plan:  Lucy Jameson was seen today for adhd and medication refill. Diagnoses and all orders for this visit:    ADHD, predominantly inattentive type  -     methylphenidate (RITALIN LA) 20 MG extended release capsule; Take 1 capsule by mouth 2 times daily for 30 days. -     methylphenidate (RITALIN LA) 20 MG extended release capsule; Take 1 capsule by mouth 2 times daily for 30 days. -     methylphenidate (RITALIN LA) 20 MG extended release capsule;  Take 1 capsule by mouth 2 times daily for 30 days. Dyshidrotic eczema  -     triamcinolone (ARISTOCORT) 0.5 % cream; Apply topically 2 times daily. Generalized anxiety disorder  -     ALPRAZolam (XANAX) 0.25 MG tablet; Take 1 tablet by mouth 2 times daily as needed for Sleep or Anxiety for up to 90 days. Panic disorder without agoraphobia  -     ALPRAZolam (XANAX) 0.25 MG tablet; Take 1 tablet by mouth 2 times daily as needed for Sleep or Anxiety for up to 90 days. OARRS reviewed and is appropriate. No signs of diversion or abuse. Due for refills at this time. Return in about 3 months (around 9/21/2022) for Medication recheck.       Seen By:  Sheila Tomlin DO

## 2022-09-20 ENCOUNTER — OFFICE VISIT (OUTPATIENT)
Dept: PRIMARY CARE CLINIC | Age: 37
End: 2022-09-20
Payer: COMMERCIAL

## 2022-09-20 VITALS
HEIGHT: 70 IN | SYSTOLIC BLOOD PRESSURE: 118 MMHG | TEMPERATURE: 97.8 F | HEART RATE: 60 BPM | BODY MASS INDEX: 24.34 KG/M2 | WEIGHT: 170 LBS | OXYGEN SATURATION: 98 % | DIASTOLIC BLOOD PRESSURE: 70 MMHG

## 2022-09-20 DIAGNOSIS — F90.0 ADHD, PREDOMINANTLY INATTENTIVE TYPE: ICD-10-CM

## 2022-09-20 DIAGNOSIS — G47.10 HYPERSOMNOLENCE: ICD-10-CM

## 2022-09-20 DIAGNOSIS — Z13.6 SCREENING FOR CARDIOVASCULAR CONDITION: ICD-10-CM

## 2022-09-20 DIAGNOSIS — F41.1 GENERALIZED ANXIETY DISORDER: Primary | ICD-10-CM

## 2022-09-20 DIAGNOSIS — E55.9 VITAMIN D INSUFFICIENCY: ICD-10-CM

## 2022-09-20 DIAGNOSIS — F41.0 PANIC DISORDER WITHOUT AGORAPHOBIA: ICD-10-CM

## 2022-09-20 PROCEDURE — 99214 OFFICE O/P EST MOD 30 MIN: CPT | Performed by: FAMILY MEDICINE

## 2022-09-20 RX ORDER — METHYLPHENIDATE HYDROCHLORIDE 20 MG/1
20 CAPSULE, EXTENDED RELEASE ORAL 2 TIMES DAILY
Qty: 60 CAPSULE | Refills: 0 | Status: SHIPPED | OUTPATIENT
Start: 2022-11-15 | End: 2022-12-15

## 2022-09-20 RX ORDER — ESCITALOPRAM OXALATE 10 MG/1
15 TABLET ORAL DAILY
Qty: 135 TABLET | Refills: 1 | Status: SHIPPED | OUTPATIENT
Start: 2022-09-20 | End: 2022-12-20

## 2022-09-20 RX ORDER — METHYLPHENIDATE HYDROCHLORIDE 20 MG/1
20 CAPSULE, EXTENDED RELEASE ORAL 2 TIMES DAILY
Qty: 60 CAPSULE | Refills: 0 | Status: SHIPPED | OUTPATIENT
Start: 2022-09-20 | End: 2022-10-20

## 2022-09-20 RX ORDER — ALPRAZOLAM 0.25 MG/1
0.25 TABLET ORAL 2 TIMES DAILY PRN
Qty: 180 TABLET | Refills: 0 | Status: SHIPPED | OUTPATIENT
Start: 2022-09-20 | End: 2022-12-19

## 2022-09-20 RX ORDER — METHYLPHENIDATE HYDROCHLORIDE 20 MG/1
20 CAPSULE, EXTENDED RELEASE ORAL 2 TIMES DAILY
Qty: 60 CAPSULE | Refills: 0 | Status: SHIPPED | OUTPATIENT
Start: 2022-10-18 | End: 2022-11-17

## 2022-09-20 ASSESSMENT — SLEEP AND FATIGUE QUESTIONNAIRES
HOW LIKELY ARE YOU TO NOD OFF OR FALL ASLEEP WHILE SITTING QUIETLY AFTER LUNCH WITHOUT ALCOHOL: 2
HOW LIKELY ARE YOU TO NOD OFF OR FALL ASLEEP WHILE WATCHING TV: 3
HOW LIKELY ARE YOU TO NOD OFF OR FALL ASLEEP WHILE LYING DOWN TO REST IN THE AFTERNOON WHEN CIRCUMSTANCES PERMIT: 3
HOW LIKELY ARE YOU TO NOD OFF OR FALL ASLEEP WHILE SITTING AND TALKING TO SOMEONE: 1
HOW LIKELY ARE YOU TO NOD OFF OR FALL ASLEEP IN A CAR, WHILE STOPPED FOR A FEW MINUTES IN TRAFFIC: 0
NECK CIRCUMFERENCE (INCHES): 14.5
HOW LIKELY ARE YOU TO NOD OFF OR FALL ASLEEP WHILE SITTING INACTIVE IN A PUBLIC PLACE: 1
HOW LIKELY ARE YOU TO NOD OFF OR FALL ASLEEP WHEN YOU ARE A PASSENGER IN A CAR FOR AN HOUR WITHOUT A BREAK: 1

## 2022-09-20 ASSESSMENT — ENCOUNTER SYMPTOMS
CONSTIPATION: 0
WHEEZING: 0
DIARRHEA: 0
ABDOMINAL PAIN: 0
COUGH: 0
BACK PAIN: 0
NAUSEA: 0
SHORTNESS OF BREATH: 0
VOMITING: 0

## 2022-09-20 NOTE — PROGRESS NOTES
22  Delroy Plate :  Sex: male  Age: 40 y.o. Chief Complaint   Patient presents with    Discuss Medications    Medication Refill     HPI:  40 y.o. male patient presents today for 3 month(s) follow up of chronic medical conditions, medication refills and FBW. Patient's chart, medical, surgical and medication history all reviewed. ADHD  Patient presents for follow up of ADHD. He has been on medication for ADHD for many year(s). His current medication is Ritalin LA- 20 mg BID. Patient has been on current medication for  many  year(s). Side effects of medications include None. compliant all of the time. Symptoms that have improved include impulsivity, inability to follow directions and inattention. Anxiety  Patient complains of evaluation of anxiety disorder and panic attacks. He has the following anxiety symptoms: difficulty concentrating, feelings of losing control, irritable, racing thoughts. Onset of symptoms was approximately several years ago, stable since that time. He denies current suicidal and homicidal ideation. Previous treatment includes Lexapro and Xanax and  no therapy . He complains of the following side effects from the treatment: none. Patient states that he hasn't had a panic attack in a long time. Sleep  Patient notes significant difficulty with sleep. Has had for several years . Sleeps approximately 6 hours a night. Patient admits periods of not breathing, tossing and turning, feels sleepy during the day. Patient denies snorting, choking, decreased memory, knees buckling with laughing, completely or partially paralyzed while falling asleep or waking up, difficulty falling asleep once awakened. Never tested for ISSA.      STOP BANG  Snores- Yes  Tired/Fatigued- Yes  Observed apneas- Yes  Pressure (HTN)- No  BMI- less than 35  Age- less than 50  Neck circ- less than 40 cm (15.7 in)  Gender- Male  Total: 3  0-2: Low risk for ISSA  3-8: High risk for ISSA    [place] Sleepiness Scale  Sleep Medicine 9/20/2022   Watching TV 3   Sitting, inactive in a public place (e.g. a theatre or a meeting) 1   As a passenger in a car for an hour without a break 1   Lying down to rest in the afternoon when circumstances permit 3   Sitting and talking to someone 1   Sitting quietly after a lunch without alcohol 2   In a car, while stopped for a few minutes in traffic 0   Neck circumference (Inches) 14.5       ROS:  Review of Systems   Constitutional:  Negative for chills, fatigue and fever. Respiratory:  Negative for cough, shortness of breath and wheezing. Cardiovascular:  Negative for chest pain and palpitations. Gastrointestinal:  Negative for abdominal pain, constipation, diarrhea, nausea and vomiting. Musculoskeletal:  Negative for arthralgias and back pain. Skin:  Negative for rash. Neurological:  Negative for dizziness and headaches. Psychiatric/Behavioral:  Positive for decreased concentration. Negative for dysphoric mood. The patient is nervous/anxious and is hyperactive. All other systems reviewed and are negative. Current Outpatient Medications on File Prior to Visit   Medication Sig Dispense Refill    triamcinolone (ARISTOCORT) 0.5 % cream Apply topically 2 times daily. 15 g 5    ALPRAZolam (XANAX) 0.25 MG tablet Take 0.25 mg by mouth 2 times daily. No current facility-administered medications on file prior to visit. No Known Allergies    Past Medical History:   Diagnosis Date    ADHD (attention deficit hyperactivity disorder)     Anxiety      Past Surgical History:   Procedure Laterality Date    WISDOM TOOTH EXTRACTION       Family History   Problem Relation Age of Onset    Depression Mother         Anxiety and Panic as well. High Blood Pressure Father     Cancer Father         Diagnosed at 46, stage 4 colon. Illiostomy was reversed. Had 2 liver resections. Currently in remission.     Colon Cancer Father     Heart Disease Father Hole in heart, found during cancer treatment as it caused a stroke when giving IVs.    High Cholesterol Father     Stroke Father         Due to hole in heart. Hes also got an aneurysm. Fun times. Heart Disease Maternal Grandfather     Diabetes Maternal Grandfather         Smoked as well for 50 years, worked in [address] [place] [address]. Heart Attack Maternal Grandfather         Had open heart surgery in . Survived that but  of cardiac arrest 1 month later. Heart Attack Paternal Grandfather     Alcohol Abuse Paternal Uncle          2021 at 58, drank nightly for 40 years. Was in liver failure and  quickly , was full of malignant tumors too. Early Death Paternal Uncle         Was 58. Alcohol Abuse Maternal Grandmother         Never met her, she was a drunk who abandoned my mom in 59. She  due to liver failure from drinking. Early Death Maternal Grandmother         Was 61. Asthma Sister     Mental Retardation Paternal Aunt         Functions at 3year old level, can communicate some. Prostate Cancer Maternal Cousin          of prostate cancer in  in his 62s. Other Paternal David Everett sister. Also Alzheimers. Paternal Fritz Sullivan also had Alzheimer's. Other Paternal Grandmother         Alzheimers. Social History     Socioeconomic History    Marital status:      Spouse name: Not on file    Number of children: Not on file    Years of education: Not on file    Highest education level: Not on file   Occupational History    Not on file   Tobacco Use    Smoking status: Former     Packs/day: 1.00     Years: 10.00     Pack years: 10.00     Types: Cigarettes     Start date: 1999     Quit date: 1/10/2010     Years since quittin.7    Smokeless tobacco: Never    Tobacco comments:     Smoked 15 per day from 14 to 24. Quit 1/1/10 and havent had one since. Substance and Sexual Activity    Alcohol use:  Yes     Alcohol/week: 3.0 standard drinks Types: 3 Cans of beer per week    Drug use: Never    Sexual activity: Yes     Partners: Female     Comment: Only been with wife since Feb. 2006   Other Topics Concern    Not on file   Social History Narrative    Not on file     Social Determinants of Health     Financial Resource Strain: Not on file   Food Insecurity: Not on file   Transportation Needs: Not on file   Physical Activity: Not on file   Stress: Not on file   Social Connections: Not on file   Intimate Partner Violence: Not on file   Housing Stability: Not on file       Vitals:    09/20/22 1352   BP: 118/70   Pulse: 60   Temp: 97.8 °F (36.6 °C)   SpO2: 98%   Weight: 170 lb (77.1 kg)   Height: 5' 10\" (1.778 m)       Physical Exam:  Physical Exam  Vitals and nursing note reviewed. Constitutional:       General: He is not in acute distress. Appearance: Normal appearance. He is well-developed and normal weight. He is not ill-appearing. HENT:      Head: Normocephalic and atraumatic. Right Ear: Hearing and external ear normal.      Left Ear: Hearing and external ear normal.      Nose:      Comments: Patient wearing mask  Eyes:      General: Lids are normal. No scleral icterus. Right eye: No discharge. Left eye: No discharge. Extraocular Movements: Extraocular movements intact. Conjunctiva/sclera: Conjunctivae normal.   Neck:      Thyroid: No thyromegaly. Cardiovascular:      Rate and Rhythm: Normal rate and regular rhythm. Heart sounds: Normal heart sounds. No murmur heard. Pulmonary:      Effort: Pulmonary effort is normal. No respiratory distress. Breath sounds: Normal breath sounds. No wheezing. Musculoskeletal:         General: No tenderness or deformity. Normal range of motion. Cervical back: Normal range of motion and neck supple. Right lower leg: No edema. Left lower leg: No edema. Lymphadenopathy:      Cervical: No cervical adenopathy. Skin:     General: Skin is warm and dry. Findings: No rash. Neurological:      General: No focal deficit present. Mental Status: He is alert and oriented to person, place, and time. Gait: Gait normal.   Psychiatric:         Mood and Affect: Mood and affect normal.         Speech: Speech normal.         Behavior: Behavior normal.         Thought Content:  Thought content normal.       Labs:  CBC with Differential:    Lab Results   Component Value Date/Time    WBC 6.2 12/14/2021 07:52 AM    RBC 5.45 12/14/2021 07:52 AM    HGB 15.8 12/14/2021 07:52 AM    HCT 47.6 12/14/2021 07:52 AM     12/14/2021 07:52 AM    MCV 87.3 12/14/2021 07:52 AM    MCH 29.0 12/14/2021 07:52 AM    MCHC 33.2 12/14/2021 07:52 AM    RDW 12.5 12/14/2021 07:52 AM    LYMPHOPCT 26.4 12/14/2021 07:52 AM    MONOPCT 6.4 12/14/2021 07:52 AM    BASOPCT 0.3 12/14/2021 07:52 AM    MONOSABS 0.40 12/14/2021 07:52 AM    LYMPHSABS 1.64 12/14/2021 07:52 AM    EOSABS 0.16 12/14/2021 07:52 AM    BASOSABS 0.02 12/14/2021 07:52 AM     CMP:    Lab Results   Component Value Date/Time     12/14/2021 07:52 AM    K 4.2 12/14/2021 07:52 AM     12/14/2021 07:52 AM    CO2 24 12/14/2021 07:52 AM    BUN 12 12/14/2021 07:52 AM    CREATININE 0.9 12/14/2021 07:52 AM    GFRAA >60 12/14/2021 07:52 AM    LABGLOM >60 12/14/2021 07:52 AM    GLUCOSE 91 12/14/2021 07:52 AM    PROT 8.4 12/14/2021 07:52 AM    LABALBU 4.5 12/14/2021 07:52 AM    CALCIUM 10.0 12/14/2021 07:52 AM    BILITOT 1.2 12/14/2021 07:52 AM    ALKPHOS 69 12/14/2021 07:52 AM    AST 26 12/14/2021 07:52 AM    ALT 10 12/14/2021 07:52 AM     HgBA1c:  No results found for: LABA1C  Microalbumen/Creatinine ratio:  No components found for: RUCREAT  FLP:    Lab Results   Component Value Date/Time    TRIG 120 12/14/2021 07:52 AM    HDL 49 12/14/2021 07:52 AM    LDLCALC 113 12/14/2021 07:52 AM    LABVLDL 24 12/14/2021 07:52 AM     TSH:    Lab Results   Component Value Date/Time    TSH 3.470 12/14/2021 07:52 AM          Assessment and Plan:  Evelio Landers was seen today for discuss medications and medication refill. Diagnoses and all orders for this visit:    Generalized anxiety disorder  -     escitalopram (LEXAPRO) 10 MG tablet; Take 1.5 tablets by mouth daily  -     ALPRAZolam (XANAX) 0.25 MG tablet; Take 1 tablet by mouth 2 times daily as needed for Sleep or Anxiety for up to 90 days. -     CBC with Auto Differential; Future  -     Comprehensive Metabolic Panel; Future  -     TSH; Future  -     Urinalysis; Future  OARRS reviewed and is appropriate. Due for refills and labs in December    ADHD, predominantly inattentive type  -     methylphenidate (RITALIN LA) 20 MG extended release capsule; Take 1 capsule by mouth 2 times daily for 30 days. -     methylphenidate (RITALIN LA) 20 MG extended release capsule; Take 1 capsule by mouth 2 times daily for 30 days. -     methylphenidate (RITALIN LA) 20 MG extended release capsule; Take 1 capsule by mouth 2 times daily for 30 days. Panic disorder without agoraphobia  -     ALPRAZolam (XANAX) 0.25 MG tablet; Take 1 tablet by mouth 2 times daily as needed for Sleep or Anxiety for up to 90 days. Hypersomnolence  Concern for apneas in sleep. Notes he wakes up multiple times per night. HR elevated in sleep. Will order home PSG. Screening for cardiovascular condition  -     Lipid Panel; Future    Vitamin D insufficiency  -     Vitamin D 25 Hydroxy; Future        Return in about 3 months (around 12/20/2022), or if symptoms worsen or fail to improve, for Chronic medical conditions.       Seen By:  Anabelle Mendoza DO

## 2022-10-13 ENCOUNTER — HOSPITAL ENCOUNTER (OUTPATIENT)
Dept: SLEEP CENTER | Age: 37
Discharge: HOME | End: 2022-10-13
Payer: COMMERCIAL

## 2022-10-13 DIAGNOSIS — R06.81 WITNESSED APNEIC SPELLS: Primary | ICD-10-CM

## 2022-10-13 DIAGNOSIS — R06.89 GASPING FOR BREATH: ICD-10-CM

## 2022-10-13 DIAGNOSIS — R06.83 SNORES: ICD-10-CM

## 2022-10-13 PROCEDURE — 95800 SLP STDY UNATTENDED: CPT

## 2022-10-19 NOTE — PROGRESS NOTES
[address]                               SLEEP STUDY REPORT    PATIENT NAME: Siobhan RM                       :        1985  MED REC NO:   [id number]                            ROOM:  ACCOUNT NO:   [de-identified]                           ADMIT DATE: 10/13/2022  PROVIDER:     Israel Steen MD    DATE OF STUDY:  10/13/2022    REFERRING PROVIDER:  Tamiko Nieves DO    STUDY PERFORMED:  Sleep study. INDICATION FOR STUDY:  Witnessed apnea, wakes gasping, loud snoring,  restless sleep, morning headaches, and nocturnal diaphoresis. CURRENT MEDICATIONS:  Ritalin SR, Lexapro, and Xanax. INTERPRETATION:  This sleep study was performed as a home sleep apnea  test.  A WatchPAT monitoring device was utilized for the study. Recording time was 6 hours and 51 minutes and sleep time was 5 hours and  39 minutes. REM stage sleep comprised 23% of the total sleep time. Review of the raw data indicates sufficient information to adequately  interpret the study. RESPIRATION SUMMARY:  The respiratory event index was 28. The patient  spent 51% of the total sleep time in the supine position. OXYGEN SATURATION:  Average oxygen saturation was 93%. Lowest  saturation was 86%. The patient spent less than 1% of the time with  saturation less than 88%. SNORING:  Snoring occurred 5% of the valid sleep time. HEART RATE SUMMARY:  Average heart rate was 61 beats per minute. MISCELLANEOUS:  [place] Sleepiness Scale score is 10/24. BMI is 24.3  pounds per inch squared. Neck circumference is 15 inches. IMPRESSION:  1. Moderate sleep apnea. 2.  Good oxygen saturation. 3.  Minimal to mild snoring. SUGGESTIONS:  1. JANA Casas, GUS/Dr. Patricia Garcia to discuss the results  of study with the patient.   2.  The patient should return to [facility] for positive  airway pressure titration. 3.  An alternative to titration would be auto-CPAP.         Yareli Loo MD  Diplomat of Sleep Medicine    D: 10/18/2022 12:38:36       T: 10/18/2022 12:40:49     [email or web address]  Job#: [id number]     Doc#: [id number]    CC:  Daly Calero MD

## 2022-10-26 ENCOUNTER — TELEPHONE (OUTPATIENT)
Dept: SLEEP CENTER | Age: 37
End: 2022-10-26

## 2022-10-26 DIAGNOSIS — G47.33 OBSTRUCTIVE SLEEP APNEA: Primary | ICD-10-CM

## 2022-10-26 NOTE — PROGRESS NOTES
Patient's home sleep study interpreted, consistent with moderate ISSA with good oxygenation. If agreeable, given lack of significant hypoxia, Auto-CPAP therapy 5-20 cmH2O ordered. Patient will be notified of results and order will be sent to preferred DME. He will be reminded of insurance requirements for compliance and 30-day window to exchange mask interface. He will follow up with Dr. Faye Cain clinic within 3 months. If he prefers to discuss sleep study results prior to CPAP initiation, an apt will be made sooner.     JANA Kirkpatrick - CNP

## 2022-10-26 NOTE — TELEPHONE ENCOUNTER
Call to pt discussed SS results and tx recommendation for pap therapy. Also discussed compliance, mask exchange and f/u appt.  Preferred DME [facility]

## 2022-12-13 DIAGNOSIS — Z13.6 SCREENING FOR CARDIOVASCULAR CONDITION: ICD-10-CM

## 2022-12-13 DIAGNOSIS — E55.9 VITAMIN D INSUFFICIENCY: ICD-10-CM

## 2022-12-13 DIAGNOSIS — F41.1 GENERALIZED ANXIETY DISORDER: ICD-10-CM

## 2022-12-13 LAB
ALBUMIN SERPL-MCNC: 4.8 G/DL (ref 3.5–5.2)
ALP BLD-CCNC: 66 U/L (ref 40–129)
ALT SERPL-CCNC: 12 U/L (ref 0–40)
ANION GAP SERPL CALCULATED.3IONS-SCNC: 14 MMOL/L (ref 7–16)
AST SERPL-CCNC: 25 U/L (ref 0–39)
BACTERIA: NORMAL /HPF
BASOPHILS ABSOLUTE: 0.02 E9/L (ref 0–0.2)
BASOPHILS RELATIVE PERCENT: 0.3 % (ref 0–2)
BILIRUB SERPL-MCNC: 1.1 MG/DL (ref 0–1.2)
BILIRUBIN URINE: NEGATIVE
BLOOD, URINE: NORMAL
BUN BLDV-MCNC: 10 MG/DL (ref 6–20)
CALCIUM SERPL-MCNC: 9.8 MG/DL (ref 8.6–10.2)
CHLORIDE BLD-SCNC: 101 MMOL/L (ref 98–107)
CHOLESTEROL, TOTAL: 175 MG/DL (ref 0–199)
CLARITY: CLEAR
CO2: 26 MMOL/L (ref 22–29)
COLOR: YELLOW
CREAT SERPL-MCNC: 0.9 MG/DL (ref 0.7–1.2)
EOSINOPHILS ABSOLUTE: 0.07 E9/L (ref 0.05–0.5)
EOSINOPHILS RELATIVE PERCENT: 1.2 % (ref 0–6)
GFR SERPL CREATININE-BSD FRML MDRD: >60 ML/MIN/1.73
GLUCOSE BLD-MCNC: 100 MG/DL (ref 74–99)
GLUCOSE URINE: NEGATIVE MG/DL
HCT VFR BLD CALC: 44.8 % (ref 37–54)
HDLC SERPL-MCNC: 48 MG/DL
HEMOGLOBIN: 14.8 G/DL (ref 12.5–16.5)
IMMATURE GRANULOCYTES #: 0.02 E9/L
IMMATURE GRANULOCYTES %: 0.3 % (ref 0–5)
KETONES, URINE: NEGATIVE MG/DL
LDL CHOLESTEROL CALCULATED: 106 MG/DL (ref 0–99)
LEUKOCYTE ESTERASE, URINE: NEGATIVE
LYMPHOCYTES ABSOLUTE: 1.62 E9/L (ref 1.5–4)
LYMPHOCYTES RELATIVE PERCENT: 27.5 % (ref 20–42)
MCH RBC QN AUTO: 29.4 PG (ref 26–35)
MCHC RBC AUTO-ENTMCNC: 33 % (ref 32–34.5)
MCV RBC AUTO: 88.9 FL (ref 80–99.9)
MONOCYTES ABSOLUTE: 0.35 E9/L (ref 0.1–0.95)
MONOCYTES RELATIVE PERCENT: 5.9 % (ref 2–12)
NEUTROPHILS ABSOLUTE: 3.82 E9/L (ref 1.8–7.3)
NEUTROPHILS RELATIVE PERCENT: 64.8 % (ref 43–80)
NITRITE, URINE: NEGATIVE
PDW BLD-RTO: 12.7 FL (ref 11.5–15)
PH UA: 7.5 (ref 5–9)
PLATELET # BLD: 266 E9/L (ref 130–450)
PMV BLD AUTO: 10.3 FL (ref 7–12)
POTASSIUM SERPL-SCNC: 4 MMOL/L (ref 3.5–5)
PROTEIN UA: NEGATIVE MG/DL
RBC # BLD: 5.04 E12/L (ref 3.8–5.8)
RBC UA: NORMAL /HPF (ref 0–2)
SODIUM BLD-SCNC: 141 MMOL/L (ref 132–146)
SPECIFIC GRAVITY UA: 1.01 (ref 1–1.03)
TOTAL PROTEIN: 8.2 G/DL (ref 6.4–8.3)
TRIGL SERPL-MCNC: 107 MG/DL (ref 0–149)
TSH SERPL DL<=0.05 MIU/L-ACNC: 3.61 UIU/ML (ref 0.27–4.2)
UROBILINOGEN, URINE: 0.2 E.U./DL
VITAMIN D 25-HYDROXY: 49 NG/ML (ref 30–100)
VLDLC SERPL CALC-MCNC: 21 MG/DL
WBC # BLD: 5.9 E9/L (ref 4.5–11.5)
WBC UA: NORMAL /HPF (ref 0–5)

## 2022-12-19 PROBLEM — G47.33 SLEEP APNEA, OBSTRUCTIVE: Status: ACTIVE | Noted: 2022-12-07

## 2022-12-19 ASSESSMENT — ENCOUNTER SYMPTOMS
WHEEZING: 0
COUGH: 0
NAUSEA: 0
BACK PAIN: 0
SHORTNESS OF BREATH: 0
ABDOMINAL PAIN: 0
DIARRHEA: 0
VOMITING: 0
CONSTIPATION: 0

## 2022-12-20 ENCOUNTER — OFFICE VISIT (OUTPATIENT)
Dept: PRIMARY CARE CLINIC | Age: 37
End: 2022-12-20
Payer: COMMERCIAL

## 2022-12-20 VITALS
WEIGHT: 173 LBS | TEMPERATURE: 99.1 F | SYSTOLIC BLOOD PRESSURE: 130 MMHG | BODY MASS INDEX: 24.77 KG/M2 | HEIGHT: 70 IN | OXYGEN SATURATION: 98 % | DIASTOLIC BLOOD PRESSURE: 80 MMHG | HEART RATE: 61 BPM

## 2022-12-20 DIAGNOSIS — G47.33 SLEEP APNEA, OBSTRUCTIVE: ICD-10-CM

## 2022-12-20 DIAGNOSIS — F90.0 ADHD, PREDOMINANTLY INATTENTIVE TYPE: Primary | ICD-10-CM

## 2022-12-20 DIAGNOSIS — F41.0 PANIC DISORDER WITHOUT AGORAPHOBIA: ICD-10-CM

## 2022-12-20 DIAGNOSIS — F41.1 GENERALIZED ANXIETY DISORDER: ICD-10-CM

## 2022-12-20 PROCEDURE — 99214 OFFICE O/P EST MOD 30 MIN: CPT | Performed by: FAMILY MEDICINE

## 2022-12-20 RX ORDER — ESCITALOPRAM OXALATE 10 MG/1
15 TABLET ORAL DAILY
Qty: 135 TABLET | Refills: 1 | Status: SHIPPED | OUTPATIENT
Start: 2022-12-20 | End: 2023-03-21

## 2022-12-20 RX ORDER — METHYLPHENIDATE HYDROCHLORIDE 20 MG/1
20 CAPSULE, EXTENDED RELEASE ORAL 2 TIMES DAILY
Qty: 60 CAPSULE | Refills: 0 | Status: SHIPPED | OUTPATIENT
Start: 2023-02-14 | End: 2023-03-16

## 2022-12-20 RX ORDER — METHYLPHENIDATE HYDROCHLORIDE 20 MG/1
20 CAPSULE, EXTENDED RELEASE ORAL 2 TIMES DAILY
Qty: 60 CAPSULE | Refills: 0 | Status: SHIPPED | OUTPATIENT
Start: 2023-01-17 | End: 2023-02-16

## 2022-12-20 RX ORDER — ALPRAZOLAM 0.25 MG/1
0.25 TABLET ORAL 2 TIMES DAILY PRN
Qty: 180 TABLET | Refills: 0 | Status: SHIPPED | OUTPATIENT
Start: 2022-12-20 | End: 2023-03-20

## 2022-12-20 RX ORDER — METHYLPHENIDATE HYDROCHLORIDE 20 MG/1
20 CAPSULE, EXTENDED RELEASE ORAL 2 TIMES DAILY
Qty: 60 CAPSULE | Refills: 0 | Status: SHIPPED | OUTPATIENT
Start: 2022-12-20 | End: 2023-01-19

## 2022-12-20 NOTE — PROGRESS NOTES
22  Delroy Means : [birth year] Sex: male  Age: 40 y.o. Chief Complaint   Patient presents with    Anxiety    ADHD     HPI:  40 y.o. male patient presents today for 3 month(s) follow up of chronic medical conditions, medication refills and FBW results. Patient's chart, medical, surgical and medication history all reviewed. ADHD  Patient presents for follow up of ADHD. He has been on medication for ADHD for many year(s). His current medication is Ritalin LA- 20 mg BID. Patient has been on current medication for  many  year(s). Side effects of medications include None. compliant all of the time. Symptoms that have improved include impulsivity, inability to follow directions and inattention. Anxiety  Patient complains of evaluation of anxiety disorder and panic attacks. He has the following anxiety symptoms: difficulty concentrating, feelings of losing control, irritable, racing thoughts. Onset of symptoms was approximately several years ago, stable since that time. He denies current suicidal and homicidal ideation. Previous treatment includes Lexapro and Xanax and  no therapy . He complains of the following side effects from the treatment: none. Patient states that he hasn't had a panic attack in a long time. ISSA:   Patient presents for follow up of ISSA. Patient is  using CPAP 8 hours/night. Patient does not use during naps. does use humidifier. No snoring on CPAP. Tolerating mask and pressure well?: Yes. Mask fitting problem including leak?: No  Significant daytime sleepiness?: No  Dry nose or throat?: No   Nocturia?: No  Edema? No  BP is well controlled. Headache in am?: No  Weight?: stable    ROS:  Review of Systems   Constitutional:  Negative for chills, fatigue and fever. Respiratory:  Negative for cough, shortness of breath and wheezing. Cardiovascular:  Negative for chest pain and palpitations.    Gastrointestinal:  Negative for abdominal pain, constipation, diarrhea, nausea and vomiting. Musculoskeletal:  Negative for arthralgias and back pain. Skin:  Negative for rash. Neurological:  Negative for dizziness and headaches. Psychiatric/Behavioral:  Positive for decreased concentration. Negative for dysphoric mood. The patient is nervous/anxious and is hyperactive. All other systems reviewed and are negative. Current Outpatient Medications on File Prior to Visit   Medication Sig Dispense Refill    triamcinolone (ARISTOCORT) 0.5 % cream Apply topically 2 times daily. 15 g 5    ALPRAZolam (XANAX) 0.25 MG tablet Take 0.25 mg by mouth 2 times daily. No current facility-administered medications on file prior to visit. No Known Allergies    Past Medical History:   Diagnosis Date    ADHD (attention deficit hyperactivity disorder)     Anxiety      Past Surgical History:   Procedure Laterality Date    WISDOM TOOTH EXTRACTION       Family History   Problem Relation Age of Onset    Depression Mother         Anxiety and Panic as well. High Blood Pressure Father     Cancer Father         Diagnosed at 46, stage 4 colon. Illiostomy was reversed. Had 2 liver resections. Currently in remission. Colon Cancer Father     Heart Disease Father         Hole in heart, found during cancer treatment as it caused a stroke when giving IVs.    High Cholesterol Father     Stroke Father         Due to hole in heart. Hes also got an aneurysm. Fun times. Heart Disease Maternal Grandfather     Diabetes Maternal Grandfather         Smoked as well for 50 years, worked in [employer]. Heart Attack Maternal Grandfather         Had open heart surgery in . Survived that but  of cardiac arrest 1 month later. Heart Attack Paternal Grandfather     Alcohol Abuse Paternal Uncle          2021 at 58, drank nightly for 40 years. Was in liver failure and  quickly , was full of malignant tumors too. Early Death Paternal Uncle         Was 58.     Alcohol Abuse Maternal Grandmother         Never met her, she was a drunk who abandoned my mom in 59. She  due to liver failure from drinking. Early Death Maternal Grandmother         Was 61. Asthma Sister     Mental Retardation Paternal Aunt         Functions at 3year old level, can communicate some. Prostate Cancer Maternal Cousin          of prostate cancer in  in his 62s. Other Paternal Nonda Harden sister. Also Alzheimers. Paternal Gen Cummings also had Alzheimer's. Other Paternal Grandmother         Alzheimers. Social History     Socioeconomic History    Marital status:      Spouse name: Not on file    Number of children: Not on file    Years of education: Not on file    Highest education level: Not on file   Occupational History    Not on file   Tobacco Use    Smoking status: Former     Packs/day: 1.00     Years: 10.00     Pack years: 10.00     Types: Cigarettes     Start date: 1999     Quit date: 1/10/2010     Years since quittin.9    Smokeless tobacco: Never    Tobacco comments:     Smoked 15 per day from 14 to 25. Quit 1/1/10 and havent had one since. Substance and Sexual Activity    Alcohol use:  Yes     Alcohol/week: 3.0 standard drinks     Types: 3 Cans of beer per week    Drug use: Never    Sexual activity: Yes     Partners: Female     Comment: Only been with wife since 2006   Other Topics Concern    Not on file   Social History Narrative    Not on file     Social Determinants of Health     Financial Resource Strain: Not on file   Food Insecurity: Not on file   Transportation Needs: Not on file   Physical Activity: Not on file   Stress: Not on file   Social Connections: Not on file   Intimate Partner Violence: Not on file   Housing Stability: Not on file       Vitals:    22 0821   BP: 130/80   Pulse: 61   Temp: 99.1 °F (37.3 °C)   SpO2: 98%   Weight: 173 lb (78.5 kg)   Height: 5' 10\" (1.778 m)       Physical Exam:  Physical Exam  Vitals and nursing note reviewed. Constitutional:       General: He is not in acute distress. Appearance: Normal appearance. He is well-developed and normal weight. He is not ill-appearing. HENT:      Head: Normocephalic and atraumatic. Right Ear: Hearing and external ear normal.      Left Ear: Hearing and external ear normal.      Nose: Nose normal.      Mouth/Throat:      Mouth: Mucous membranes are moist.   Eyes:      General: Lids are normal. No scleral icterus. Right eye: No discharge. Left eye: No discharge. Extraocular Movements: Extraocular movements intact. Conjunctiva/sclera: Conjunctivae normal.   Neck:      Thyroid: No thyromegaly. Cardiovascular:      Rate and Rhythm: Normal rate and regular rhythm. Heart sounds: Normal heart sounds. No murmur heard. Pulmonary:      Effort: Pulmonary effort is normal. No respiratory distress. Breath sounds: Normal breath sounds. No wheezing. Musculoskeletal:         General: No tenderness or deformity. Normal range of motion. Cervical back: Normal range of motion and neck supple. Right lower leg: No edema. Left lower leg: No edema. Lymphadenopathy:      Cervical: No cervical adenopathy. Skin:     General: Skin is warm and dry. Findings: No rash. Neurological:      General: No focal deficit present. Mental Status: He is alert and oriented to person, place, and time. Gait: Gait normal.   Psychiatric:         Mood and Affect: Mood and affect normal.         Speech: Speech normal.         Behavior: Behavior normal.         Thought Content:  Thought content normal.       Labs:  CBC with Differential:    Lab Results   Component Value Date/Time    WBC 5.9 12/13/2022 08:03 AM    RBC 5.04 12/13/2022 08:03 AM    HGB 14.8 12/13/2022 08:03 AM    HCT 44.8 12/13/2022 08:03 AM     12/13/2022 08:03 AM    MCV 88.9 12/13/2022 08:03 AM    MCH 29.4 12/13/2022 08:03 AM    MCHC 33.0 12/13/2022 08:03 AM RDW 12.7 12/13/2022 08:03 AM    LYMPHOPCT 27.5 12/13/2022 08:03 AM    MONOPCT 5.9 12/13/2022 08:03 AM    BASOPCT 0.3 12/13/2022 08:03 AM    MONOSABS 0.35 12/13/2022 08:03 AM    LYMPHSABS 1.62 12/13/2022 08:03 AM    EOSABS 0.07 12/13/2022 08:03 AM    BASOSABS 0.02 12/13/2022 08:03 AM     CMP:    Lab Results   Component Value Date/Time     12/13/2022 08:03 AM    K 4.0 12/13/2022 08:03 AM     12/13/2022 08:03 AM    CO2 26 12/13/2022 08:03 AM    BUN 10 12/13/2022 08:03 AM    CREATININE 0.9 12/13/2022 08:03 AM    GFRAA >60 12/14/2021 07:52 AM    LABGLOM >60 12/13/2022 08:03 AM    GLUCOSE 100 12/13/2022 08:03 AM    PROT 8.2 12/13/2022 08:03 AM    LABALBU 4.8 12/13/2022 08:03 AM    CALCIUM 9.8 12/13/2022 08:03 AM    BILITOT 1.1 12/13/2022 08:03 AM    ALKPHOS 66 12/13/2022 08:03 AM    AST 25 12/13/2022 08:03 AM    ALT 12 12/13/2022 08:03 AM     HgBA1c:  No results found for: LABA1C  Microalbumen/Creatinine ratio:  No components found for: RUCREAT  FLP:    Lab Results   Component Value Date/Time    TRIG 107 12/13/2022 08:03 AM    HDL 48 12/13/2022 08:03 AM    LDLCALC 106 12/13/2022 08:03 AM    LABVLDL 21 12/13/2022 08:03 AM     TSH:    Lab Results   Component Value Date/Time    TSH 3.610 12/13/2022 08:03 AM          Assessment and Plan:  Ricki Wells was seen today for anxiety and adhd. Diagnoses and all orders for this visit:    ADHD, predominantly inattentive type  -     methylphenidate (RITALIN LA) 20 MG extended release capsule; Take 1 capsule by mouth 2 times daily for 30 days. -     methylphenidate (RITALIN LA) 20 MG extended release capsule; Take 1 capsule by mouth 2 times daily for 30 days. -     methylphenidate (RITALIN LA) 20 MG extended release capsule; Take 1 capsule by mouth 2 times daily for 30 days. OARRS appropriate    Generalized anxiety disorder  -     escitalopram (LEXAPRO) 10 MG tablet; Take 1.5 tablets by mouth daily  -     ALPRAZolam (XANAX) 0.25 MG tablet;  Take 1 tablet by mouth 2 times daily as needed for Sleep or Anxiety for up to 90 days. Stable. Labs reviewed in detail. Panic disorder without agoraphobia  -     ALPRAZolam (XANAX) 0.25 MG tablet; Take 1 tablet by mouth 2 times daily as needed for Sleep or Anxiety for up to 90 days. Sleep apnea, obstructive  Has follow up with Sleep medicine next month. Return in about 3 months (around 3/20/2023), or if symptoms worsen or fail to improve, for Well visit.       Seen By:  John Hugo, DO

## 2023-02-22 ENCOUNTER — OFFICE VISIT (OUTPATIENT)
Dept: SLEEP MEDICINE | Age: 38
End: 2023-02-22
Payer: COMMERCIAL

## 2023-02-22 VITALS
WEIGHT: 180.2 LBS | BODY MASS INDEX: 25.8 KG/M2 | RESPIRATION RATE: 12 BRPM | HEART RATE: 68 BPM | OXYGEN SATURATION: 98 % | HEIGHT: 70 IN | DIASTOLIC BLOOD PRESSURE: 98 MMHG | SYSTOLIC BLOOD PRESSURE: 151 MMHG

## 2023-02-22 DIAGNOSIS — G47.33 OSA (OBSTRUCTIVE SLEEP APNEA): Primary | ICD-10-CM

## 2023-02-22 PROCEDURE — 99204 OFFICE O/P NEW MOD 45 MIN: CPT | Performed by: INTERNAL MEDICINE

## 2023-02-22 RX ORDER — MULTIVITAMIN
1 TABLET ORAL DAILY
COMMUNITY

## 2023-02-22 ASSESSMENT — SLEEP AND FATIGUE QUESTIONNAIRES
HOW LIKELY ARE YOU TO NOD OFF OR FALL ASLEEP IN A CAR, WHILE STOPPED FOR A FEW MINUTES IN TRAFFIC: 0
HOW LIKELY ARE YOU TO NOD OFF OR FALL ASLEEP WHILE SITTING AND READING: 2
HOW LIKELY ARE YOU TO NOD OFF OR FALL ASLEEP WHILE SITTING INACTIVE IN A PUBLIC PLACE: 1
HOW LIKELY ARE YOU TO NOD OFF OR FALL ASLEEP WHILE SITTING QUIETLY AFTER LUNCH WITHOUT ALCOHOL: 1
HOW LIKELY ARE YOU TO NOD OFF OR FALL ASLEEP WHILE SITTING AND TALKING TO SOMEONE: 1
HOW LIKELY ARE YOU TO NOD OFF OR FALL ASLEEP WHEN YOU ARE A PASSENGER IN A CAR FOR AN HOUR WITHOUT A BREAK: 0
HOW LIKELY ARE YOU TO NOD OFF OR FALL ASLEEP WHILE LYING DOWN TO REST IN THE AFTERNOON WHEN CIRCUMSTANCES PERMIT: 2
HOW LIKELY ARE YOU TO NOD OFF OR FALL ASLEEP WHILE WATCHING TV: 2
ESS TOTAL SCORE: 9

## 2023-02-22 ASSESSMENT — ENCOUNTER SYMPTOMS
EYES NEGATIVE: 1
RESPIRATORY NEGATIVE: 1
ALLERGIC/IMMUNOLOGIC NEGATIVE: 1
GASTROINTESTINAL NEGATIVE: 1

## 2023-02-22 NOTE — PROGRESS NOTES
[facility] Sleep Medicine    Patient Name: Emanuel Isaacs  Age: 40 y.o.   : 1985    Date of Visit: 23        HPI   Emanuel Isaacs is a 40 y.o. male with  has a past medical history of ADHD (attention deficit hyperactivity disorder) and Anxiety. who presents as a new patient to Sleep Clinic, referred by Dr. Tiffanie Blank, for Sleep Apnea  .       Sleep Medicine 2023   Sitting and reading 2 -   Watching TV 2 3   Sitting, inactive in a public place (e.g. a theatre or a meeting) 1 1   As a passenger in a car for an hour without a break 0 1   Lying down to rest in the afternoon when circumstances permit 2 3   Sitting and talking to someone 1 1   Sitting quietly after a lunch without alcohol 1 2   In a car, while stopped for a few minutes in traffic 0 0   [place] Sleepiness Score 9 -   Neck circumference (Inches) - 14.5      Np presenting alone for ISSA recently dx on HST    Notes hx enlarged tonsils, not planning to remove at this age    Possibly dad has ISSA undiagnosed    Previously was hypersomnolent, and waking gasping from sleep, and waking up with headaches, which led to testing for the sleep apnea    Had home sleep test oct '22  - watchpat, RDI 28/hr    Using distilled water, baby soap for cleaning supplies  [facility]     Full face mask with foam and magnetic closure- no implanted medical devices    Notes significant family hx cardiovascular dx    Works in IT, up at 4 am    Takes Ritalin when he wakes up and around lunchtime ~ 10 am, at latest may take at 2 pm but not causing insomnia    Xanax bid prn for anxiety, not causing hypersomnolence    Lexapro at night, not causing insomnia      PMH:  Past Medical History:   Diagnosis Date    ADHD (attention deficit hyperactivity disorder)     Anxiety         PSH:  Past Surgical History:   Procedure Laterality Date    WISDOM TOOTH EXTRACTION            Soc Hx:  Social History     Tobacco Use    Smoking status: Former     Packs/day: 1.00     Years: 10.00     Pack years: 10.00     Types: Cigarettes     Start date: 1999     Quit date: 1/10/2010     Years since quittin.1    Smokeless tobacco: Never    Tobacco comments:     Smoked 15 per day from 14 to 24. Quit 1/1/10 and havent had one since. Substance Use Topics    Alcohol use: Yes     Alcohol/week: 3.0 standard drinks     Types: 3 Cans of beer per week    Drug use: Never        Fam Hx:  Family History   Problem Relation Age of Onset    Depression Mother         Anxiety and Panic as well. High Blood Pressure Father     Cancer Father         Diagnosed at 46, stage 4 colon. Illiostomy was reversed. Had 2 liver resections. Currently in remission. Colon Cancer Father     Heart Disease Father         Hole in heart, found during cancer treatment as it caused a stroke when giving IVs.    High Cholesterol Father     Stroke Father         Due to hole in heart. Hes also got an aneurysm. Fun times. Heart Disease Maternal Grandfather     Diabetes Maternal Grandfather         Smoked as well for 50 years, worked in [address] [place] [address]. Heart Attack Maternal Grandfather         Had open heart surgery in . Survived that but  of cardiac arrest 1 month later. Heart Attack Paternal Grandfather     Alcohol Abuse Paternal Uncle          2021 at 58, drank nightly for 40 years. Was in liver failure and  quickly , was full of malignant tumors too. Early Death Paternal Uncle         Was 58. Alcohol Abuse Maternal Grandmother         Never met her, she was a drunk who abandoned my mom in 59. She  due to liver failure from drinking. Early Death Maternal Grandmother         Was 61. Asthma Sister     Mental Retardation Paternal Aunt         Functions at 3year old level, can communicate some. Prostate Cancer Maternal Cousin          of prostate cancer in  in his 62s. Other Paternal Jeremy Green sister. Also Alzheimers. Paternal Trisha Casas also had Alzheimer's. Other Paternal Grandmother         Alzheimers. Current Outpatient Medications   Medication Sig Dispense Refill    Multiple Vitamin (MULTI-VITAMIN DAILY) TABS Take 1 tablet by mouth daily      escitalopram (LEXAPRO) 10 MG tablet Take 1.5 tablets by mouth daily 135 tablet 1    methylphenidate (RITALIN LA) 20 MG extended release capsule Take 1 capsule by mouth 2 times daily for 30 days. 60 capsule 0    ALPRAZolam (XANAX) 0.25 MG tablet Take 1 tablet by mouth 2 times daily as needed for Sleep or Anxiety for up to 90 days. 180 tablet 0    triamcinolone (ARISTOCORT) 0.5 % cream Apply topically 2 times daily. 15 g 5    ALPRAZolam (XANAX) 0.25 MG tablet Take 0.25 mg by mouth 2 times daily. methylphenidate (RITALIN LA) 20 MG extended release capsule Take 1 capsule by mouth 2 times daily for 30 days. 60 capsule 0    methylphenidate (RITALIN LA) 20 MG extended release capsule Take 1 capsule by mouth 2 times daily for 30 days. 60 capsule 0     No current facility-administered medications for this visit. Review of Systems  (Sleep ROS above)  Review of Systems   Constitutional:  Positive for fatigue (imrpoved with CPAP). HENT: Negative. Eyes: Negative. Respiratory: Negative. Cardiovascular: Negative. Gastrointestinal: Negative. Endocrine: Negative. Genitourinary: Negative. Skin: Negative. Allergic/Immunologic: Negative. Neurological: Negative. Hematological: Negative. Psychiatric/Behavioral: Negative. Objective:   Physical Exam  BP (!) 151/98 (Site: Left Upper Arm, Position: Sitting, Cuff Size: Medium Adult)   Pulse 68   Resp 12   Ht 5' 10\" (1.778 m)   Wt 180 lb 3.2 oz (81.7 kg)   SpO2 98%   BMI 25.86 kg/m²        Physical Exam  Vitals reviewed. Constitutional:       Appearance: He is not ill-appearing or diaphoretic. HENT:      Head: Atraumatic.       Mouth/Throat:      Comments: MP 4  Enlarged tongue   Slightly smaller mandible  Eyes: Extraocular Movements: Extraocular movements intact. Cardiovascular:      Rate and Rhythm: Normal rate and regular rhythm. Pulmonary:      Effort: Pulmonary effort is normal.      Breath sounds: Normal breath sounds. Musculoskeletal:         General: No signs of injury. Skin:     General: Skin is warm and dry. Neurological:      General: No focal deficit present. Mental Status: He is alert. Psychiatric:         Mood and Affect: Mood normal.           Sleep Medicine 2/22/2023 9/20/2022   Sitting and reading 2 -   Watching TV 2 3   Sitting, inactive in a public place (e.g. a theatre or a meeting) 1 1   As a passenger in a car for an hour without a break 0 1   Lying down to rest in the afternoon when circumstances permit 2 3   Sitting and talking to someone 1 1   Sitting quietly after a lunch without alcohol 1 2   In a car, while stopped for a few minutes in traffic 0 0   [place] Sleepiness Score 9 -   Neck circumference (Inches) - 14.5         SLEEP STUDY HISTORY      10-13-22 HST watchpat DAVID 28            PERTINENT LAB RESULTS  No results found for: FERRITIN       Assessment:      Charlene Garcia was seen today for sleep apnea. Diagnoses and all orders for this visit:    ISSA (obstructive sleep apnea)     Plan:      Compliance of at least 4 hours is 100% with residual AHI 1.9. Patient is clearly benefiting from CPAP therapy. Continue CPAP 5-20 cm H2O. Return in about 1 year (around 2/22/2024).     Ean Cummings DO  Sleep Medicine   [facility] Phone -[phone], option 2  Fax- [phone]

## 2023-03-05 ENCOUNTER — E-VISIT (OUTPATIENT)
Dept: PRIMARY CARE CLINIC | Age: 38
End: 2023-03-05
Payer: COMMERCIAL

## 2023-03-05 DIAGNOSIS — J06.9 UPPER RESPIRATORY TRACT INFECTION, UNSPECIFIED TYPE: Primary | ICD-10-CM

## 2023-03-05 DIAGNOSIS — R05.1 ACUTE COUGH: ICD-10-CM

## 2023-03-05 DIAGNOSIS — Z20.818 EXPOSURE TO STREP THROAT: ICD-10-CM

## 2023-03-05 PROCEDURE — 99422 OL DIG E/M SVC 11-20 MIN: CPT | Performed by: FAMILY MEDICINE

## 2023-03-05 RX ORDER — CEFDINIR 300 MG/1
300 CAPSULE ORAL 2 TIMES DAILY
Qty: 14 CAPSULE | Refills: 0 | Status: SHIPPED | OUTPATIENT
Start: 2023-03-05 | End: 2023-03-12

## 2023-03-05 RX ORDER — PREDNISONE 10 MG/1
TABLET ORAL
Qty: 30 TABLET | Refills: 0 | Status: SHIPPED | OUTPATIENT
Start: 2023-03-05 | End: 2023-03-17

## 2023-03-05 ASSESSMENT — LIFESTYLE VARIABLES
SMOKING_YEARS: 10
PACKS_PER_DAY: 1
SMOKING_STATUS: NO, BUT I USED TO SMOKE

## 2023-03-06 NOTE — PROGRESS NOTES
3/5/23  Delroy RM Plate :  Sex: male  Age: 40 y.o. MyChart Evisit questionnaire reviewed. HPI:  40 y.o. male with complaint of URI symptoms. Exposed to strep. History:  Current Outpatient Medications on File Prior to Visit   Medication Sig Dispense Refill    Multiple Vitamin (MULTI-VITAMIN DAILY) TABS Take 1 tablet by mouth daily      escitalopram (LEXAPRO) 10 MG tablet Take 1.5 tablets by mouth daily 135 tablet 1    methylphenidate (RITALIN LA) 20 MG extended release capsule Take 1 capsule by mouth 2 times daily for 30 days. 60 capsule 0    ALPRAZolam (XANAX) 0.25 MG tablet Take 1 tablet by mouth 2 times daily as needed for Sleep or Anxiety for up to 90 days. 180 tablet 0    methylphenidate (RITALIN LA) 20 MG extended release capsule Take 1 capsule by mouth 2 times daily for 30 days. 60 capsule 0    methylphenidate (RITALIN LA) 20 MG extended release capsule Take 1 capsule by mouth 2 times daily for 30 days. 60 capsule 0    triamcinolone (ARISTOCORT) 0.5 % cream Apply topically 2 times daily. 15 g 5    ALPRAZolam (XANAX) 0.25 MG tablet Take 0.25 mg by mouth 2 times daily. No current facility-administered medications on file prior to visit. No Known Allergies    Past Medical History:   Diagnosis Date    ADHD (attention deficit hyperactivity disorder)     Anxiety      Past Surgical History:   Procedure Laterality Date    WISDOM TOOTH EXTRACTION       Family History   Problem Relation Age of Onset    Depression Mother         Anxiety and Panic as well. High Blood Pressure Father     Cancer Father         Diagnosed at 46, stage 4 colon. Illiostomy was reversed. Had 2 liver resections. Currently in remission. Colon Cancer Father     Heart Disease Father         Hole in heart, found during cancer treatment as it caused a stroke when giving IVs.    High Cholesterol Father     Stroke Father         Due to hole in heart. Hes also got an aneurysm. Fun times.     Heart Disease Maternal Grandfather     Diabetes Maternal Grandfather         Smoked as well for 50 years, worked in [address] [place] [address]. Heart Attack Maternal Grandfather         Had open heart surgery in . Survived that but  of cardiac arrest 1 month later. Heart Attack Paternal Grandfather     Alcohol Abuse Paternal Uncle          2021 at 58, drank nightly for 40 years. Was in liver failure and  quickly , was full of malignant tumors too. Early Death Paternal Uncle         Was 58. Alcohol Abuse Maternal Grandmother         Never met her, she was a drunk who abandoned my mom in 59. She  due to liver failure from drinking. Early Death Maternal Grandmother         Was 61. Asthma Sister     Mental Retardation Paternal Aunt         Functions at 3year old level, can communicate some. Prostate Cancer Maternal Cousin          of prostate cancer in  in his 62s. Other Paternal Milady Fridge sister. Also Alzheimers. Paternal Corbin Rodriguez also had Alzheimer's. Other Paternal Grandmother         Alzheimers. Social History       Tobacco History       Smoking Status  Former Smoking Start Date  1999 Quit Date  1/10/2010 Smoking Frequency  1 pack/day for 10.00 years (10.00 pk-yrs)    Smoking Tobacco Type  Cigarettes from 1999 to 1/10/2010      Smokeless Tobacco Use  Never      Tobacco Comments  Smoked 15 per day from 14 to 24. Quit 1/1/10 and havent had one since. Assessment and Plan:  Diagnoses and all orders for this visit:    Upper respiratory tract infection, unspecified type  -     predniSONE (DELTASONE) 10 MG tablet; Take 4 tablets by mouth daily for 3 days, THEN 3 tablets daily for 3 days, THEN 2 tablets daily for 3 days, THEN 1 tablet daily for 3 days. Exposure to strep throat  -     cefdinir (OMNICEF) 300 MG capsule; Take 1 capsule by mouth 2 times daily for 7 days    Acute cough  -     predniSONE (DELTASONE) 10 MG tablet;  Take 4 tablets by mouth daily for 3 days, THEN 3 tablets daily for 3 days, THEN 2 tablets daily for 3 days, THEN 1 tablet daily for 3 days. Without physical evaluation, unable to tell if bacterial or viral.  Given exposure to strep and fevers, will treat with antibiotics. Will use Prednisone to help with cough and congestion. Patient instructed to come into the office for evaluation if symptoms worsen in any way.      12 minutes were spent on the digital evaluation and management of this patient    Raad Alvarez DO  3/5/23  9:26 PM

## 2023-03-21 ENCOUNTER — OFFICE VISIT (OUTPATIENT)
Dept: PRIMARY CARE CLINIC | Age: 38
End: 2023-03-21
Payer: COMMERCIAL

## 2023-03-21 VITALS
HEART RATE: 74 BPM | BODY MASS INDEX: 26.05 KG/M2 | DIASTOLIC BLOOD PRESSURE: 80 MMHG | OXYGEN SATURATION: 98 % | WEIGHT: 182 LBS | TEMPERATURE: 97.4 F | HEIGHT: 70 IN | SYSTOLIC BLOOD PRESSURE: 130 MMHG

## 2023-03-21 DIAGNOSIS — Z00.00 ENCOUNTER FOR WELL ADULT EXAM WITHOUT ABNORMAL FINDINGS: Primary | ICD-10-CM

## 2023-03-21 DIAGNOSIS — F41.0 PANIC DISORDER WITHOUT AGORAPHOBIA: ICD-10-CM

## 2023-03-21 DIAGNOSIS — R73.01 IMPAIRED FASTING GLUCOSE: ICD-10-CM

## 2023-03-21 DIAGNOSIS — F41.1 GENERALIZED ANXIETY DISORDER: ICD-10-CM

## 2023-03-21 DIAGNOSIS — Z13.6 SCREENING FOR CARDIOVASCULAR CONDITION: ICD-10-CM

## 2023-03-21 DIAGNOSIS — E55.9 VITAMIN D INSUFFICIENCY: ICD-10-CM

## 2023-03-21 DIAGNOSIS — F90.0 ADHD, PREDOMINANTLY INATTENTIVE TYPE: ICD-10-CM

## 2023-03-21 PROCEDURE — 99395 PREV VISIT EST AGE 18-39: CPT | Performed by: FAMILY MEDICINE

## 2023-03-21 RX ORDER — METHYLPHENIDATE HYDROCHLORIDE 20 MG/1
20 CAPSULE, EXTENDED RELEASE ORAL 2 TIMES DAILY
Qty: 60 CAPSULE | Refills: 0 | Status: SHIPPED | OUTPATIENT
Start: 2023-05-16 | End: 2023-06-15

## 2023-03-21 RX ORDER — ESCITALOPRAM OXALATE 10 MG/1
15 TABLET ORAL DAILY
Qty: 135 TABLET | Refills: 1 | Status: SHIPPED | OUTPATIENT
Start: 2023-03-21 | End: 2023-06-20

## 2023-03-21 RX ORDER — METHYLPHENIDATE HYDROCHLORIDE 20 MG/1
20 CAPSULE, EXTENDED RELEASE ORAL 2 TIMES DAILY
Qty: 60 CAPSULE | Refills: 0 | Status: SHIPPED | OUTPATIENT
Start: 2023-03-21 | End: 2023-04-20

## 2023-03-21 RX ORDER — METHYLPHENIDATE HYDROCHLORIDE 20 MG/1
20 CAPSULE, EXTENDED RELEASE ORAL 2 TIMES DAILY
Qty: 60 CAPSULE | Refills: 0 | Status: SHIPPED | OUTPATIENT
Start: 2023-04-18 | End: 2023-05-18

## 2023-03-21 RX ORDER — ALPRAZOLAM 0.25 MG/1
0.25 TABLET ORAL 2 TIMES DAILY
Qty: 180 TABLET | Refills: 0 | Status: SHIPPED
Start: 2023-03-21 | End: 2023-03-23 | Stop reason: SDUPTHER

## 2023-03-21 SDOH — ECONOMIC STABILITY: INCOME INSECURITY: HOW HARD IS IT FOR YOU TO PAY FOR THE VERY BASICS LIKE FOOD, HOUSING, MEDICAL CARE, AND HEATING?: NOT HARD AT ALL

## 2023-03-21 SDOH — ECONOMIC STABILITY: FOOD INSECURITY: WITHIN THE PAST 12 MONTHS, THE FOOD YOU BOUGHT JUST DIDN'T LAST AND YOU DIDN'T HAVE MONEY TO GET MORE.: NEVER TRUE

## 2023-03-21 SDOH — ECONOMIC STABILITY: FOOD INSECURITY: WITHIN THE PAST 12 MONTHS, YOU WORRIED THAT YOUR FOOD WOULD RUN OUT BEFORE YOU GOT MONEY TO BUY MORE.: NEVER TRUE

## 2023-03-21 SDOH — ECONOMIC STABILITY: HOUSING INSECURITY
IN THE LAST 12 MONTHS, WAS THERE A TIME WHEN YOU DID NOT HAVE A STEADY PLACE TO SLEEP OR SLEPT IN A SHELTER (INCLUDING NOW)?: NO

## 2023-03-21 ASSESSMENT — PATIENT HEALTH QUESTIONNAIRE - PHQ9
1. LITTLE INTEREST OR PLEASURE IN DOING THINGS: 0
SUM OF ALL RESPONSES TO PHQ9 QUESTIONS 1 & 2: 0
2. FEELING DOWN, DEPRESSED OR HOPELESS: 0
SUM OF ALL RESPONSES TO PHQ QUESTIONS 1-9: 0

## 2023-03-21 ASSESSMENT — ENCOUNTER SYMPTOMS
WHEEZING: 0
SHORTNESS OF BREATH: 0
NAUSEA: 0
COUGH: 0
ABDOMINAL PAIN: 0
CONSTIPATION: 0
BACK PAIN: 0
DIARRHEA: 0
VOMITING: 0

## 2023-03-21 NOTE — PROGRESS NOTES
WISDOM TOOTH EXTRACTION       Family History   Problem Relation Age of Onset    Depression Mother         Anxiety and Panic as well. High Blood Pressure Father     Cancer Father         Diagnosed at 46, stage 4 colon. Illiostomy was reversed. Had 2 liver resections. Currently in remission. Colon Cancer Father     Heart Disease Father         Hole in heart, found during cancer treatment as it caused a stroke when giving IVs.    High Cholesterol Father     Stroke Father         Due to hole in heart. Hes also got an aneurysm. Fun times. Heart Disease Maternal Grandfather     Diabetes Maternal Grandfather         Smoked as well for 50 years, worked in [address] [place] [address]. Heart Attack Maternal Grandfather         Had open heart surgery in . Survived that but  of cardiac arrest 1 month later. Heart Attack Paternal Grandfather     Alcohol Abuse Paternal Uncle          2021 at 58, drank nightly for 40 years. Was in liver failure and  quickly , was full of malignant tumors too. Early Death Paternal Uncle         Was 58. Alcohol Abuse Maternal Grandmother         Never met her, she was a drunk who abandoned my mom in 59. She  due to liver failure from drinking. Early Death Maternal Grandmother         Was 61. Asthma Sister     Mental Retardation Paternal Aunt         Functions at 3year old level, can communicate some. Prostate Cancer Maternal Cousin          of prostate cancer in  in his 62s. Other Paternal Fatimah Holts sister. Also Alzheimers. Paternal Radha Peraza also had Alzheimer's. Other Paternal Grandmother         Alzheimers.      Social History     Socioeconomic History    Marital status:      Spouse name: Not on file    Number of children: Not on file    Years of education: Not on file    Highest education level: Not on file   Occupational History    Not on file   Tobacco Use    Smoking status: Former     Packs/day: 1.00     Years: 10.00

## 2023-03-23 ENCOUNTER — PATIENT MESSAGE (OUTPATIENT)
Dept: PRIMARY CARE CLINIC | Age: 38
End: 2023-03-23

## 2023-03-23 DIAGNOSIS — F41.0 PANIC DISORDER WITHOUT AGORAPHOBIA: ICD-10-CM

## 2023-03-23 RX ORDER — ALPRAZOLAM 0.25 MG/1
0.25 TABLET ORAL 2 TIMES DAILY
Qty: 180 TABLET | Refills: 0 | Status: SHIPPED | OUTPATIENT
Start: 2023-03-23 | End: 2023-06-21

## 2023-03-23 NOTE — TELEPHONE ENCOUNTER
From: Arianne RM Plate  To: Dr. Puneet King  Sent: 3/23/2023 3:12 PM EDT  Subject: Need Xanax transfer to [address] [place] [address],  So the Xanax is on backorder in [place] with no idea when they can get it, but the one on [facility] has it. They said they can't send it over to [address] [place] [address] since it's \"new\". Can you send just the Xanax this time to [facility] instead?

## 2023-06-21 ENCOUNTER — OFFICE VISIT (OUTPATIENT)
Dept: PRIMARY CARE CLINIC | Age: 38
End: 2023-06-21
Payer: COMMERCIAL

## 2023-06-21 VITALS
DIASTOLIC BLOOD PRESSURE: 80 MMHG | WEIGHT: 183 LBS | HEART RATE: 72 BPM | HEIGHT: 70 IN | TEMPERATURE: 97.6 F | OXYGEN SATURATION: 98 % | SYSTOLIC BLOOD PRESSURE: 116 MMHG | BODY MASS INDEX: 26.2 KG/M2

## 2023-06-21 DIAGNOSIS — F41.0 PANIC DISORDER WITHOUT AGORAPHOBIA: ICD-10-CM

## 2023-06-21 DIAGNOSIS — F90.0 ADHD, PREDOMINANTLY INATTENTIVE TYPE: Primary | ICD-10-CM

## 2023-06-21 PROCEDURE — 99213 OFFICE O/P EST LOW 20 MIN: CPT | Performed by: FAMILY MEDICINE

## 2023-06-21 RX ORDER — METHYLPHENIDATE HYDROCHLORIDE 20 MG/1
20 CAPSULE, EXTENDED RELEASE ORAL 2 TIMES DAILY
Qty: 60 CAPSULE | Refills: 0 | Status: SHIPPED | OUTPATIENT
Start: 2023-08-16 | End: 2023-09-15

## 2023-06-21 RX ORDER — METHYLPHENIDATE HYDROCHLORIDE 20 MG/1
20 CAPSULE, EXTENDED RELEASE ORAL 2 TIMES DAILY
Qty: 60 CAPSULE | Refills: 0 | Status: SHIPPED | OUTPATIENT
Start: 2023-06-21 | End: 2023-07-21

## 2023-06-21 RX ORDER — METHYLPHENIDATE HYDROCHLORIDE 20 MG/1
20 CAPSULE, EXTENDED RELEASE ORAL 2 TIMES DAILY
Qty: 60 CAPSULE | Refills: 0 | Status: SHIPPED | OUTPATIENT
Start: 2023-07-19 | End: 2023-08-18

## 2023-06-21 RX ORDER — ALPRAZOLAM 0.25 MG/1
0.25 TABLET ORAL 2 TIMES DAILY
Qty: 180 TABLET | Refills: 0 | Status: SHIPPED | OUTPATIENT
Start: 2023-06-21 | End: 2023-09-19

## 2023-06-21 ASSESSMENT — ENCOUNTER SYMPTOMS
WHEEZING: 0
VOMITING: 0
SHORTNESS OF BREATH: 0
BACK PAIN: 0
COUGH: 0
DIARRHEA: 0
CONSTIPATION: 0
ABDOMINAL PAIN: 0
NAUSEA: 0

## 2023-06-21 NOTE — PROGRESS NOTES
23  Delroy Means : [id number] Sex: male  Age: 45 y.o. Chief Complaint   Patient presents with    ADHD     HPI:  45 y.o. male patient presents today for 3 month(s) follow up of chronic medical conditions, medication refills and FBW results. Patient's chart, medical, surgical and medication history all reviewed. ADHD  Patient presents for follow up of ADHD. He has been on medication for ADHD for many year(s). His current medication is Ritalin LA- 20 mg BID. Patient has been on current medication for  many  year(s). Side effects of medications include None. compliant all of the time. Symptoms that have improved include impulsivity, inability to follow directions and inattention. Anxiety  Patient complains of evaluation of anxiety disorder and panic attacks. He has the following anxiety symptoms: difficulty concentrating, feelings of losing control, irritable, racing thoughts. Onset of symptoms was approximately several years ago, stable since that time. He denies current suicidal and homicidal ideation. Previous treatment includes Lexapro and Xanax and  no therapy . He complains of the following side effects from the treatment: none. Patient states that he hasn't had a panic attack in a long time. ISSA:   Patient presents for follow up of ISSA. Patient is  using CPAP 8 hours/night. Patient does not use during naps. does use humidifier. No snoring on CPAP. Tolerating mask and pressure well?: Yes. Mask fitting problem including leak?: No  Significant daytime sleepiness?: No  Dry nose or throat?: No   Nocturia?: No  Edema? No  BP is well controlled. Headache in am?: No  Weight?: stable    ROS:  Review of Systems   Constitutional:  Negative for chills, fatigue and fever. Respiratory:  Negative for cough, shortness of breath and wheezing. Cardiovascular:  Negative for chest pain and palpitations.    Gastrointestinal:  Negative for abdominal pain, constipation, diarrhea, nausea

## 2023-06-29 ENCOUNTER — PATIENT MESSAGE (OUTPATIENT)
Dept: PRIMARY CARE CLINIC | Age: 38
End: 2023-06-29

## 2023-06-29 DIAGNOSIS — F90.0 ADHD, PREDOMINANTLY INATTENTIVE TYPE: ICD-10-CM

## 2023-07-05 RX ORDER — METHYLPHENIDATE HYDROCHLORIDE 20 MG/1
20 CAPSULE, EXTENDED RELEASE ORAL 2 TIMES DAILY
Qty: 60 CAPSULE | Refills: 0 | Status: SHIPPED
Start: 2023-07-19 | End: 2023-07-17 | Stop reason: SDUPTHER

## 2023-07-07 DIAGNOSIS — L30.1 DYSHIDROTIC ECZEMA: ICD-10-CM

## 2023-07-10 RX ORDER — TRIAMCINOLONE ACETONIDE 5 MG/G
CREAM TOPICAL
Qty: 15 G | Refills: 5 | Status: SHIPPED | OUTPATIENT
Start: 2023-07-10

## 2023-07-17 RX ORDER — METHYLPHENIDATE HYDROCHLORIDE 20 MG/1
20 CAPSULE, EXTENDED RELEASE ORAL 2 TIMES DAILY
Qty: 60 CAPSULE | Refills: 0 | Status: SHIPPED | OUTPATIENT
Start: 2023-07-19 | End: 2023-08-18

## 2023-07-19 RX ORDER — DEXTROAMPHETAMINE SACCHARATE, AMPHETAMINE ASPARTATE, DEXTROAMPHETAMINE SULFATE AND AMPHETAMINE SULFATE 3.75; 3.75; 3.75; 3.75 MG/1; MG/1; MG/1; MG/1
15 TABLET ORAL 2 TIMES DAILY
Qty: 60 TABLET | Refills: 0 | Status: SHIPPED | OUTPATIENT
Start: 2023-07-19 | End: 2023-08-18

## 2023-09-15 DIAGNOSIS — F90.0 ADHD, PREDOMINANTLY INATTENTIVE TYPE: ICD-10-CM

## 2023-09-18 RX ORDER — DEXTROAMPHETAMINE SACCHARATE, AMPHETAMINE ASPARTATE, DEXTROAMPHETAMINE SULFATE AND AMPHETAMINE SULFATE 3.75; 3.75; 3.75; 3.75 MG/1; MG/1; MG/1; MG/1
15 TABLET ORAL 2 TIMES DAILY
Qty: 60 TABLET | Refills: 0 | Status: SHIPPED
Start: 2023-09-18 | End: 2023-09-21 | Stop reason: SDUPTHER

## 2023-09-20 ASSESSMENT — ENCOUNTER SYMPTOMS
VOMITING: 0
COUGH: 0
ABDOMINAL PAIN: 0
NAUSEA: 0
BACK PAIN: 0
SHORTNESS OF BREATH: 0
DIARRHEA: 0
WHEEZING: 0
CONSTIPATION: 0

## 2023-09-21 ENCOUNTER — OFFICE VISIT (OUTPATIENT)
Dept: PRIMARY CARE CLINIC | Age: 38
End: 2023-09-21
Payer: COMMERCIAL

## 2023-09-21 VITALS
HEIGHT: 70 IN | BODY MASS INDEX: 26.2 KG/M2 | TEMPERATURE: 98 F | OXYGEN SATURATION: 97 % | DIASTOLIC BLOOD PRESSURE: 80 MMHG | WEIGHT: 183 LBS | HEART RATE: 69 BPM | SYSTOLIC BLOOD PRESSURE: 128 MMHG

## 2023-09-21 DIAGNOSIS — F41.1 GENERALIZED ANXIETY DISORDER: ICD-10-CM

## 2023-09-21 DIAGNOSIS — F41.0 PANIC DISORDER WITHOUT AGORAPHOBIA: ICD-10-CM

## 2023-09-21 DIAGNOSIS — F90.0 ADHD, PREDOMINANTLY INATTENTIVE TYPE: Primary | ICD-10-CM

## 2023-09-21 PROCEDURE — 99213 OFFICE O/P EST LOW 20 MIN: CPT | Performed by: FAMILY MEDICINE

## 2023-09-21 RX ORDER — DEXTROAMPHETAMINE SACCHARATE, AMPHETAMINE ASPARTATE, DEXTROAMPHETAMINE SULFATE AND AMPHETAMINE SULFATE 3.75; 3.75; 3.75; 3.75 MG/1; MG/1; MG/1; MG/1
15 TABLET ORAL 2 TIMES DAILY
Qty: 60 TABLET | Refills: 0 | Status: SHIPPED | OUTPATIENT
Start: 2023-10-16 | End: 2023-11-15

## 2023-09-21 RX ORDER — ESCITALOPRAM OXALATE 10 MG/1
15 TABLET ORAL DAILY
Qty: 135 TABLET | Refills: 1 | Status: SHIPPED | OUTPATIENT
Start: 2023-09-21

## 2023-09-21 RX ORDER — DEXTROAMPHETAMINE SACCHARATE, AMPHETAMINE ASPARTATE, DEXTROAMPHETAMINE SULFATE AND AMPHETAMINE SULFATE 3.75; 3.75; 3.75; 3.75 MG/1; MG/1; MG/1; MG/1
15 TABLET ORAL 2 TIMES DAILY
Qty: 60 TABLET | Refills: 0 | Status: SHIPPED | OUTPATIENT
Start: 2023-12-11 | End: 2024-01-10

## 2023-09-21 RX ORDER — DEXTROAMPHETAMINE SACCHARATE, AMPHETAMINE ASPARTATE, DEXTROAMPHETAMINE SULFATE AND AMPHETAMINE SULFATE 3.75; 3.75; 3.75; 3.75 MG/1; MG/1; MG/1; MG/1
15 TABLET ORAL 2 TIMES DAILY
Qty: 60 TABLET | Refills: 0 | Status: SHIPPED | OUTPATIENT
Start: 2023-11-13 | End: 2023-12-13

## 2023-09-21 RX ORDER — ALPRAZOLAM 0.25 MG/1
0.25 TABLET ORAL 2 TIMES DAILY
Qty: 180 TABLET | Refills: 1 | Status: SHIPPED | OUTPATIENT
Start: 2023-09-21 | End: 2023-12-20

## 2023-09-21 NOTE — PROGRESS NOTES
edema. Left lower leg: No edema. Lymphadenopathy:      Cervical: No cervical adenopathy. Skin:     General: Skin is warm and dry. Findings: No rash. Neurological:      General: No focal deficit present. Mental Status: He is alert and oriented to person, place, and time. Gait: Gait normal.   Psychiatric:         Mood and Affect: Mood and affect normal.         Speech: Speech normal.         Behavior: Behavior normal.         Thought Content:  Thought content normal.         Labs:  CBC with Differential:    Lab Results   Component Value Date/Time    WBC 5.9 12/13/2022 08:03 AM    RBC 5.04 12/13/2022 08:03 AM    HGB 14.8 12/13/2022 08:03 AM    HCT 44.8 12/13/2022 08:03 AM     12/13/2022 08:03 AM    MCV 88.9 12/13/2022 08:03 AM    MCH 29.4 12/13/2022 08:03 AM    MCHC 33.0 12/13/2022 08:03 AM    RDW 12.7 12/13/2022 08:03 AM    LYMPHOPCT 27.5 12/13/2022 08:03 AM    MONOPCT 5.9 12/13/2022 08:03 AM    BASOPCT 0.3 12/13/2022 08:03 AM    MONOSABS 0.35 12/13/2022 08:03 AM    LYMPHSABS 1.62 12/13/2022 08:03 AM    EOSABS 0.07 12/13/2022 08:03 AM    BASOSABS 0.02 12/13/2022 08:03 AM     CMP:    Lab Results   Component Value Date/Time     12/13/2022 08:03 AM    K 4.0 12/13/2022 08:03 AM     12/13/2022 08:03 AM    CO2 26 12/13/2022 08:03 AM    BUN 10 12/13/2022 08:03 AM    CREATININE 0.9 12/13/2022 08:03 AM    GFRAA >60 12/14/2021 07:52 AM    LABGLOM >60 12/13/2022 08:03 AM    GLUCOSE 100 12/13/2022 08:03 AM    PROT 8.2 12/13/2022 08:03 AM    LABALBU 4.8 12/13/2022 08:03 AM    CALCIUM 9.8 12/13/2022 08:03 AM    BILITOT 1.1 12/13/2022 08:03 AM    ALKPHOS 66 12/13/2022 08:03 AM    AST 25 12/13/2022 08:03 AM    ALT 12 12/13/2022 08:03 AM     HgBA1c:  No results found for: \"LABA1C\"  Microalbumen/Creatinine ratio:  No components found for: \"RUCREAT\"  FLP:    Lab Results   Component Value Date/Time    TRIG 107 12/13/2022 08:03 AM    HDL 48 12/13/2022 08:03 AM    LDLCALC 106 12/13/2022 08:03 AM

## 2024-01-08 DIAGNOSIS — R73.01 IMPAIRED FASTING GLUCOSE: ICD-10-CM

## 2024-01-08 DIAGNOSIS — E55.9 VITAMIN D INSUFFICIENCY: ICD-10-CM

## 2024-01-08 DIAGNOSIS — Z00.00 ENCOUNTER FOR WELL ADULT EXAM WITHOUT ABNORMAL FINDINGS: ICD-10-CM

## 2024-01-08 DIAGNOSIS — F41.1 GENERALIZED ANXIETY DISORDER: ICD-10-CM

## 2024-01-08 DIAGNOSIS — Z13.6 SCREENING FOR CARDIOVASCULAR CONDITION: ICD-10-CM

## 2024-01-08 LAB
ABSOLUTE IMMATURE GRANULOCYTE: <0.03 K/UL (ref 0–0.58)
ALBUMIN SERPL-MCNC: 5 G/DL (ref 3.5–5.2)
ALP BLD-CCNC: 88 U/L (ref 40–129)
ALT SERPL-CCNC: 14 U/L (ref 0–40)
ANION GAP SERPL CALCULATED.3IONS-SCNC: 19 MMOL/L (ref 7–16)
AST SERPL-CCNC: 20 U/L (ref 0–39)
BASOPHILS ABSOLUTE: 0.03 K/UL (ref 0–0.2)
BASOPHILS RELATIVE PERCENT: 0 % (ref 0–2)
BILIRUB SERPL-MCNC: 0.9 MG/DL (ref 0–1.2)
BILIRUBIN URINE: NEGATIVE
BUN BLDV-MCNC: 13 MG/DL (ref 6–20)
CALCIUM SERPL-MCNC: 10 MG/DL (ref 8.6–10.2)
CHLORIDE BLD-SCNC: 100 MMOL/L (ref 98–107)
CHOLESTEROL: 197 MG/DL
CO2: 24 MMOL/L (ref 22–29)
COLOR: YELLOW
CREAT SERPL-MCNC: 0.9 MG/DL (ref 0.7–1.2)
EOSINOPHILS ABSOLUTE: 0.07 K/UL (ref 0.05–0.5)
EOSINOPHILS RELATIVE PERCENT: 1 % (ref 0–6)
GFR SERPL CREATININE-BSD FRML MDRD: >60 ML/MIN/1.73M2
GLUCOSE BLD-MCNC: 99 MG/DL (ref 74–99)
GLUCOSE URINE: NEGATIVE MG/DL
HBA1C MFR BLD: 5.6 % (ref 4–5.6)
HCT VFR BLD CALC: 48.8 % (ref 37–54)
HDLC SERPL-MCNC: 52 MG/DL
HEMOGLOBIN: 16.1 G/DL (ref 12.5–16.5)
IMMATURE GRANULOCYTES: 0 % (ref 0–5)
KETONES, URINE: NEGATIVE MG/DL
LDL CHOLESTEROL: 121 MG/DL
LEUKOCYTE ESTERASE, URINE: ABNORMAL
LYMPHOCYTES ABSOLUTE: 1.51 K/UL (ref 1.5–4)
LYMPHOCYTES RELATIVE PERCENT: 22 % (ref 20–42)
MCH RBC QN AUTO: 29.2 PG (ref 26–35)
MCHC RBC AUTO-ENTMCNC: 33 G/DL (ref 32–34.5)
MCV RBC AUTO: 88.4 FL (ref 80–99.9)
MONOCYTES ABSOLUTE: 0.36 K/UL (ref 0.1–0.95)
MONOCYTES RELATIVE PERCENT: 5 % (ref 2–12)
NEUTROPHILS ABSOLUTE: 4.78 K/UL (ref 1.8–7.3)
NEUTROPHILS RELATIVE PERCENT: 71 % (ref 43–80)
NITRITE, URINE: NEGATIVE
PDW BLD-RTO: 12.2 % (ref 11.5–15)
PH UA: 7 (ref 5–9)
PLATELET # BLD: 330 K/UL (ref 130–450)
PMV BLD AUTO: 9.7 FL (ref 7–12)
POTASSIUM SERPL-SCNC: 4.2 MMOL/L (ref 3.5–5)
PROTEIN UA: NEGATIVE MG/DL
RBC # BLD: 5.52 M/UL (ref 3.8–5.8)
RBC UA: NORMAL /HPF
SODIUM BLD-SCNC: 143 MMOL/L (ref 132–146)
SPECIFIC GRAVITY UA: <1.005 (ref 1–1.03)
TOTAL PROTEIN: 9.2 G/DL (ref 6.4–8.3)
TRIGL SERPL-MCNC: 120 MG/DL
TSH SERPL DL<=0.05 MIU/L-ACNC: 3.6 UIU/ML (ref 0.27–4.2)
TURBIDITY: CLEAR
URINE HGB: ABNORMAL
UROBILINOGEN, URINE: 0.2 EU/DL (ref 0–1)
VITAMIN D 25-HYDROXY: 41.1 NG/ML (ref 30–100)
VLDLC SERPL CALC-MCNC: 24 MG/DL
WBC # BLD: 6.8 K/UL (ref 4.5–11.5)
WBC UA: NORMAL /HPF

## 2024-01-10 ENCOUNTER — OFFICE VISIT (OUTPATIENT)
Dept: PRIMARY CARE CLINIC | Age: 39
End: 2024-01-10
Payer: COMMERCIAL

## 2024-01-10 VITALS
BODY MASS INDEX: 24.34 KG/M2 | WEIGHT: 170 LBS | HEIGHT: 70 IN | TEMPERATURE: 98.8 F | SYSTOLIC BLOOD PRESSURE: 140 MMHG | OXYGEN SATURATION: 97 % | HEART RATE: 98 BPM | DIASTOLIC BLOOD PRESSURE: 72 MMHG

## 2024-01-10 DIAGNOSIS — F41.0 PANIC DISORDER WITHOUT AGORAPHOBIA: ICD-10-CM

## 2024-01-10 DIAGNOSIS — F41.1 GENERALIZED ANXIETY DISORDER: ICD-10-CM

## 2024-01-10 DIAGNOSIS — R03.0 ELEVATED BP WITHOUT DIAGNOSIS OF HYPERTENSION: ICD-10-CM

## 2024-01-10 DIAGNOSIS — F90.0 ADHD, PREDOMINANTLY INATTENTIVE TYPE: Primary | ICD-10-CM

## 2024-01-10 PROCEDURE — 99214 OFFICE O/P EST MOD 30 MIN: CPT | Performed by: FAMILY MEDICINE

## 2024-01-10 RX ORDER — DEXTROAMPHETAMINE SACCHARATE, AMPHETAMINE ASPARTATE, DEXTROAMPHETAMINE SULFATE AND AMPHETAMINE SULFATE 3.75; 3.75; 3.75; 3.75 MG/1; MG/1; MG/1; MG/1
15 TABLET ORAL 2 TIMES DAILY
Qty: 60 TABLET | Refills: 0 | Status: SHIPPED | OUTPATIENT
Start: 2024-03-06 | End: 2024-04-05

## 2024-01-10 RX ORDER — DEXTROAMPHETAMINE SACCHARATE, AMPHETAMINE ASPARTATE, DEXTROAMPHETAMINE SULFATE AND AMPHETAMINE SULFATE 3.75; 3.75; 3.75; 3.75 MG/1; MG/1; MG/1; MG/1
15 TABLET ORAL 2 TIMES DAILY
Qty: 60 TABLET | Refills: 0 | Status: SHIPPED | OUTPATIENT
Start: 2024-01-10 | End: 2024-02-09

## 2024-01-10 RX ORDER — ALPRAZOLAM 0.25 MG/1
0.25 TABLET ORAL 2 TIMES DAILY
Qty: 180 TABLET | Refills: 1 | Status: SHIPPED | OUTPATIENT
Start: 2024-01-10 | End: 2024-07-08

## 2024-01-10 RX ORDER — DEXTROAMPHETAMINE SACCHARATE, AMPHETAMINE ASPARTATE, DEXTROAMPHETAMINE SULFATE AND AMPHETAMINE SULFATE 3.75; 3.75; 3.75; 3.75 MG/1; MG/1; MG/1; MG/1
15 TABLET ORAL 2 TIMES DAILY
Qty: 60 TABLET | Refills: 0 | Status: SHIPPED | OUTPATIENT
Start: 2024-02-07 | End: 2024-03-08

## 2024-01-10 RX ORDER — ESCITALOPRAM OXALATE 10 MG/1
15 TABLET ORAL DAILY
Qty: 135 TABLET | Refills: 1 | Status: SHIPPED | OUTPATIENT
Start: 2024-01-10

## 2024-01-10 ASSESSMENT — PATIENT HEALTH QUESTIONNAIRE - PHQ9
SUM OF ALL RESPONSES TO PHQ9 QUESTIONS 1 & 2: 0
SUM OF ALL RESPONSES TO PHQ QUESTIONS 1-9: 0
SUM OF ALL RESPONSES TO PHQ QUESTIONS 1-9: 0
2. FEELING DOWN, DEPRESSED OR HOPELESS: 0
1. LITTLE INTEREST OR PLEASURE IN DOING THINGS: 0
SUM OF ALL RESPONSES TO PHQ QUESTIONS 1-9: 0
SUM OF ALL RESPONSES TO PHQ QUESTIONS 1-9: 0

## 2024-01-10 ASSESSMENT — ENCOUNTER SYMPTOMS
WHEEZING: 0
ABDOMINAL PAIN: 0
CONSTIPATION: 0
NAUSEA: 0
SHORTNESS OF BREATH: 0
BACK PAIN: 0
VOMITING: 0
COUGH: 0
DIARRHEA: 0

## 2024-01-10 NOTE — PROGRESS NOTES
Was in liver failure and  quickly , was full of malignant tumors too.    Early Death Paternal Uncle         Was 62.    Alcohol Abuse Maternal Grandmother         Never met her, she was a drunk who abandoned my mom in 64. She  due to liver failure from drinking.    Early Death Maternal Grandmother         Was 60.    Asthma Sister     Mental Retardation Paternal Aunt         Functions at 4 year old level, can communicate some.    Prostate Cancer Maternal Cousin          of prostate cancer in  in his 60s.    Other Paternal Aunt         Helens sister. Also Alzheimers. Paternal Great Grandfather also had Alzheimer's.    Other Paternal Grandmother         Alzheimers.     Social History     Socioeconomic History    Marital status:      Spouse name: Not on file    Number of children: Not on file    Years of education: Not on file    Highest education level: Not on file   Occupational History    Not on file   Tobacco Use    Smoking status: Former     Current packs/day: 0.00     Average packs/day: 1 pack/day for 10.7 years (10.7 ttl pk-yrs)     Types: Cigarettes     Start date: 1999     Quit date: 1/10/2010     Years since quittin.0    Smokeless tobacco: Never    Tobacco comments:     Smoked 15 per day from 14 to 24. Quit 1/1/10 and havent had one since.   Substance and Sexual Activity    Alcohol use: Yes     Alcohol/week: 3.0 standard drinks of alcohol     Types: 3 Cans of beer per week    Drug use: Never    Sexual activity: Yes     Partners: Female     Comment: Only been with wife since 2006   Other Topics Concern    Not on file   Social History Narrative    Not on file     Social Determinants of Health     Financial Resource Strain: Low Risk  (3/21/2023)    Overall Financial Resource Strain (CARDIA)     Difficulty of Paying Living Expenses: Not hard at all   Food Insecurity: Not on file (3/21/2023)   Transportation Needs: Unknown (3/21/2023)    PRAPARE - Transportation     Lack of

## 2024-02-14 ENCOUNTER — OFFICE VISIT (OUTPATIENT)
Dept: SLEEP MEDICINE | Age: 39
End: 2024-02-14
Payer: COMMERCIAL

## 2024-02-14 VITALS
WEIGHT: 168.87 LBS | HEIGHT: 70 IN | RESPIRATION RATE: 17 BRPM | DIASTOLIC BLOOD PRESSURE: 92 MMHG | OXYGEN SATURATION: 98 % | BODY MASS INDEX: 24.18 KG/M2 | SYSTOLIC BLOOD PRESSURE: 138 MMHG | HEART RATE: 75 BPM

## 2024-02-14 DIAGNOSIS — G47.33 OSA (OBSTRUCTIVE SLEEP APNEA): Primary | ICD-10-CM

## 2024-02-14 PROCEDURE — 99214 OFFICE O/P EST MOD 30 MIN: CPT | Performed by: INTERNAL MEDICINE

## 2024-02-14 NOTE — PROGRESS NOTES
Fam Hx:  Family History   Problem Relation Age of Onset    Depression Mother         Anxiety and Panic as well.    High Blood Pressure Father     Cancer Father         Diagnosed at 52, stage 4 colon. Illiostomy was reversed. Had 2 liver resections. Currently in remission.    Colon Cancer Father     Heart Disease Father         Hole in heart, found during cancer treatment as it caused a stroke when giving IVs.    High Cholesterol Father     Stroke Father         Due to hole in heart. Hes also got an aneurysm. Fun times.    Heart Disease Maternal Grandfather     Diabetes Maternal Grandfather         Smoked as well for 50 years, worked in steel mill.    Heart Attack Maternal Grandfather         Had open heart surgery in . Survived that but  of cardiac arrest 1 month later.    Heart Attack Paternal Grandfather     Alcohol Abuse Paternal Uncle          2021 at 62, drank nightly for 40 years. Was in liver failure and  quickly , was full of malignant tumors too.    Early Death Paternal Uncle         Was 62.    Alcohol Abuse Maternal Grandmother         Never met her, she was a drunk who abandoned my mom in 64. She  due to liver failure from drinking.    Early Death Maternal Grandmother         Was 60.    Asthma Sister     Mental Retardation Paternal Aunt         Functions at 4 year old level, can communicate some.    Prostate Cancer Maternal Cousin          of prostate cancer in  in his 60s.    Other Paternal Aunt         Helens sister. Also Alzheimers. Paternal Great Grandfather also had Alzheimer's.    Other Paternal Grandmother         Alzheimers.        Current Outpatient Medications   Medication Sig Dispense Refill    ALPRAZolam (XANAX) 0.25 MG tablet Take 1 tablet by mouth 2 times daily for 180 days. Max Daily Amount: 0.5 mg 180 tablet 1    [START ON 3/6/2024] amphetamine-dextroamphetamine (ADDERALL, 15MG,) 15 MG tablet Take 1 tablet by mouth 2 times daily for 30 days. Max

## 2024-04-09 SDOH — ECONOMIC STABILITY: FOOD INSECURITY: WITHIN THE PAST 12 MONTHS, THE FOOD YOU BOUGHT JUST DIDN'T LAST AND YOU DIDN'T HAVE MONEY TO GET MORE.: NEVER TRUE

## 2024-04-09 SDOH — ECONOMIC STABILITY: FOOD INSECURITY: WITHIN THE PAST 12 MONTHS, YOU WORRIED THAT YOUR FOOD WOULD RUN OUT BEFORE YOU GOT MONEY TO BUY MORE.: NEVER TRUE

## 2024-04-09 SDOH — ECONOMIC STABILITY: INCOME INSECURITY: HOW HARD IS IT FOR YOU TO PAY FOR THE VERY BASICS LIKE FOOD, HOUSING, MEDICAL CARE, AND HEATING?: NOT HARD AT ALL

## 2024-04-09 SDOH — ECONOMIC STABILITY: TRANSPORTATION INSECURITY
IN THE PAST 12 MONTHS, HAS LACK OF TRANSPORTATION KEPT YOU FROM MEETINGS, WORK, OR FROM GETTING THINGS NEEDED FOR DAILY LIVING?: NO

## 2024-04-11 ENCOUNTER — OFFICE VISIT (OUTPATIENT)
Dept: PRIMARY CARE CLINIC | Age: 39
End: 2024-04-11
Payer: COMMERCIAL

## 2024-04-11 VITALS
WEIGHT: 164 LBS | BODY MASS INDEX: 23.48 KG/M2 | TEMPERATURE: 97.9 F | DIASTOLIC BLOOD PRESSURE: 80 MMHG | SYSTOLIC BLOOD PRESSURE: 124 MMHG | HEART RATE: 79 BPM | OXYGEN SATURATION: 97 % | HEIGHT: 70 IN

## 2024-04-11 DIAGNOSIS — F41.0 PANIC DISORDER WITHOUT AGORAPHOBIA: ICD-10-CM

## 2024-04-11 DIAGNOSIS — E55.9 VITAMIN D INSUFFICIENCY: ICD-10-CM

## 2024-04-11 DIAGNOSIS — F41.1 GENERALIZED ANXIETY DISORDER: Primary | ICD-10-CM

## 2024-04-11 DIAGNOSIS — Z13.6 SCREENING FOR CARDIOVASCULAR CONDITION: ICD-10-CM

## 2024-04-11 DIAGNOSIS — R73.01 IMPAIRED FASTING GLUCOSE: ICD-10-CM

## 2024-04-11 DIAGNOSIS — F90.0 ADHD, PREDOMINANTLY INATTENTIVE TYPE: ICD-10-CM

## 2024-04-11 PROCEDURE — 99214 OFFICE O/P EST MOD 30 MIN: CPT | Performed by: FAMILY MEDICINE

## 2024-04-11 RX ORDER — ALPRAZOLAM 0.25 MG/1
0.25 TABLET ORAL 2 TIMES DAILY
Qty: 180 TABLET | Refills: 1 | Status: CANCELLED | OUTPATIENT
Start: 2024-04-11 | End: 2024-10-08

## 2024-04-11 RX ORDER — DEXTROAMPHETAMINE SACCHARATE, AMPHETAMINE ASPARTATE, DEXTROAMPHETAMINE SULFATE AND AMPHETAMINE SULFATE 3.75; 3.75; 3.75; 3.75 MG/1; MG/1; MG/1; MG/1
15 TABLET ORAL 2 TIMES DAILY
Qty: 60 TABLET | Refills: 0 | Status: SHIPPED | OUTPATIENT
Start: 2024-06-06 | End: 2024-07-06

## 2024-04-11 RX ORDER — DEXTROAMPHETAMINE SACCHARATE, AMPHETAMINE ASPARTATE, DEXTROAMPHETAMINE SULFATE AND AMPHETAMINE SULFATE 3.75; 3.75; 3.75; 3.75 MG/1; MG/1; MG/1; MG/1
15 TABLET ORAL 2 TIMES DAILY
Qty: 60 TABLET | Refills: 0 | Status: SHIPPED | OUTPATIENT
Start: 2024-05-09 | End: 2024-06-08

## 2024-04-11 RX ORDER — DEXTROAMPHETAMINE SACCHARATE, AMPHETAMINE ASPARTATE, DEXTROAMPHETAMINE SULFATE AND AMPHETAMINE SULFATE 3.75; 3.75; 3.75; 3.75 MG/1; MG/1; MG/1; MG/1
15 TABLET ORAL 2 TIMES DAILY
Qty: 60 TABLET | Refills: 0 | Status: SHIPPED | OUTPATIENT
Start: 2024-04-11 | End: 2024-05-11

## 2024-04-11 RX ORDER — ESCITALOPRAM OXALATE 10 MG/1
15 TABLET ORAL DAILY
Qty: 135 TABLET | Refills: 1 | Status: CANCELLED | OUTPATIENT
Start: 2024-04-11

## 2024-04-11 ASSESSMENT — ENCOUNTER SYMPTOMS
COUGH: 0
BACK PAIN: 0
WHEEZING: 0
ABDOMINAL PAIN: 0
SHORTNESS OF BREATH: 0
NAUSEA: 0
VOMITING: 0
CONSTIPATION: 0
DIARRHEA: 0

## 2024-04-11 NOTE — PROGRESS NOTES
24  Delroy Means : 1985 Sex: male  Age: 38 y.o.    Chief Complaint   Patient presents with    Anxiety    ADHD     HPI:  38 y.o. male patient presents today for 3 month(s) follow up of chronic medical conditions, medication refills and FBW. Patient's chart, medical, surgical and medication history all reviewed.    ADHD  Patient presents for follow up of ADHD.  He has been on medication for ADHD for many year(s).  His current medication is Adderall IR- 15 mg BID.  Previously on Ritalin LA- 20 mg BID.  Patient has been on current medication for  many  year(s).  Side effects of medications include None.  compliant all of the time.  Symptoms that have improved include impulsivity, inability to follow directions and inattention.   Patient had to switch to Adderall due to shortage of Ritalin.  Doing well on Adderall.     Anxiety  Patient complains of evaluation of anxiety disorder and panic attacks.  He has the following anxiety symptoms: difficulty concentrating, feelings of losing control, irritable, racing thoughts. Onset of symptoms was approximately several years ago, stable since that time. He denies current suicidal and homicidal ideation.  Previous treatment includes Lexapro and Xanax and  no therapy .  He complains of the following side effects from the treatment: none.  Patient states that he hasn't had a panic attack in a long time.     Hyperlipidemia  The ASCVD Risk score ([place] DK, et al., 2019) failed to calculate for the following reasons:    The 2019 ASCVD risk score is only valid for ages 40 to 79    ISSA:   Patient presents for follow up of ISSA.  Patient is  using CPAP 8 hours/night.   Patient does not use during naps.  does use humidifier.  No snoring on CPAP.  Tolerating mask and pressure well?: Yes.  Mask fitting problem including leak?: No  Significant daytime sleepiness?: No  Dry nose or throat?: No   Nocturia?: No  Edema? No  BP is well controlled.  Headache in am?: No  Weight?:

## 2024-07-08 DIAGNOSIS — E55.9 VITAMIN D INSUFFICIENCY: ICD-10-CM

## 2024-07-08 DIAGNOSIS — R73.01 IMPAIRED FASTING GLUCOSE: ICD-10-CM

## 2024-07-08 DIAGNOSIS — F41.1 GENERALIZED ANXIETY DISORDER: ICD-10-CM

## 2024-07-08 DIAGNOSIS — Z13.6 SCREENING FOR CARDIOVASCULAR CONDITION: ICD-10-CM

## 2024-07-08 LAB
ALBUMIN: 4.7 G/DL (ref 3.5–5.2)
ALP BLD-CCNC: 77 U/L (ref 40–129)
ALT SERPL-CCNC: 12 U/L (ref 0–40)
ANION GAP SERPL CALCULATED.3IONS-SCNC: 13 MMOL/L (ref 7–16)
AST SERPL-CCNC: 22 U/L (ref 0–39)
BACTERIA: ABNORMAL
BASOPHILS ABSOLUTE: 0.02 K/UL (ref 0–0.2)
BASOPHILS RELATIVE PERCENT: 0 % (ref 0–2)
BILIRUB SERPL-MCNC: 1.4 MG/DL (ref 0–1.2)
BILIRUBIN, URINE: NEGATIVE
BUN BLDV-MCNC: 12 MG/DL (ref 6–20)
CALCIUM SERPL-MCNC: 9.7 MG/DL (ref 8.6–10.2)
CHLORIDE BLD-SCNC: 103 MMOL/L (ref 98–107)
CHOLESTEROL, TOTAL: 179 MG/DL
CO2: 24 MMOL/L (ref 22–29)
COLOR, UA: YELLOW
CREAT SERPL-MCNC: 0.2 MG/DL (ref 0.7–1.2)
EOSINOPHILS ABSOLUTE: 0.08 K/UL (ref 0.05–0.5)
EOSINOPHILS RELATIVE PERCENT: 2 % (ref 0–6)
GFR, ESTIMATED: >90 ML/MIN/1.73M2
GLUCOSE BLD-MCNC: 104 MG/DL (ref 74–99)
GLUCOSE URINE: NEGATIVE MG/DL
HBA1C MFR BLD: 5.5 % (ref 4–5.6)
HCT VFR BLD CALC: 45.6 % (ref 37–54)
HDLC SERPL-MCNC: 59 MG/DL
HEMOGLOBIN: 14.8 G/DL (ref 12.5–16.5)
IMMATURE GRANULOCYTES %: 0 % (ref 0–5)
IMMATURE GRANULOCYTES ABSOLUTE: <0.03 K/UL (ref 0–0.58)
KETONES, URINE: NEGATIVE MG/DL
LDL CHOLESTEROL: 100 MG/DL
LEUKOCYTE ESTERASE, URINE: ABNORMAL
LYMPHOCYTES ABSOLUTE: 1.11 K/UL (ref 1.5–4)
LYMPHOCYTES RELATIVE PERCENT: 21 % (ref 20–42)
MCH RBC QN AUTO: 29 PG (ref 26–35)
MCHC RBC AUTO-ENTMCNC: 32.5 G/DL (ref 32–34.5)
MCV RBC AUTO: 89.2 FL (ref 80–99.9)
MONOCYTES ABSOLUTE: 0.26 K/UL (ref 0.1–0.95)
MONOCYTES RELATIVE PERCENT: 5 % (ref 2–12)
NEUTROPHILS ABSOLUTE: 3.7 K/UL (ref 1.8–7.3)
NEUTROPHILS RELATIVE PERCENT: 72 % (ref 43–80)
NITRITE, URINE: NEGATIVE
PDW BLD-RTO: 12.5 % (ref 11.5–15)
PH, URINE: 7 (ref 5–9)
PLATELET # BLD: 278 K/UL (ref 130–450)
PMV BLD AUTO: 10.5 FL (ref 7–12)
POTASSIUM SERPL-SCNC: 4.2 MMOL/L (ref 3.5–5)
PROTEIN UA: NEGATIVE MG/DL
RBC # BLD: 5.11 M/UL (ref 3.8–5.8)
RBC UA: ABNORMAL /HPF
SODIUM BLD-SCNC: 140 MMOL/L (ref 132–146)
SPECIFIC GRAVITY UA: 1.01 (ref 1–1.03)
TOTAL PROTEIN: 8.4 G/DL (ref 6.4–8.3)
TRIGL SERPL-MCNC: 98 MG/DL
TSH SERPL DL<=0.05 MIU/L-ACNC: 3.18 UIU/ML (ref 0.27–4.2)
TURBIDITY: CLEAR
URINE HGB: NEGATIVE
UROBILINOGEN, URINE: 0.2 EU/DL (ref 0–1)
VITAMIN D 25-HYDROXY: 48.2 NG/ML (ref 30–100)
VLDLC SERPL CALC-MCNC: 20 MG/DL
WBC # BLD: 5.2 K/UL (ref 4.5–11.5)
WBC UA: ABNORMAL /HPF

## 2024-07-11 ENCOUNTER — OFFICE VISIT (OUTPATIENT)
Dept: PRIMARY CARE CLINIC | Age: 39
End: 2024-07-11
Payer: COMMERCIAL

## 2024-07-11 VITALS
SYSTOLIC BLOOD PRESSURE: 124 MMHG | BODY MASS INDEX: 22.19 KG/M2 | OXYGEN SATURATION: 97 % | HEIGHT: 70 IN | TEMPERATURE: 97.3 F | HEART RATE: 83 BPM | WEIGHT: 155 LBS | DIASTOLIC BLOOD PRESSURE: 78 MMHG

## 2024-07-11 DIAGNOSIS — F41.1 GENERALIZED ANXIETY DISORDER: ICD-10-CM

## 2024-07-11 DIAGNOSIS — F90.0 ADHD, PREDOMINANTLY INATTENTIVE TYPE: ICD-10-CM

## 2024-07-11 DIAGNOSIS — Z00.00 ENCOUNTER FOR WELL ADULT EXAM WITHOUT ABNORMAL FINDINGS: Primary | ICD-10-CM

## 2024-07-11 DIAGNOSIS — F41.0 PANIC DISORDER WITHOUT AGORAPHOBIA: ICD-10-CM

## 2024-07-11 PROCEDURE — 99395 PREV VISIT EST AGE 18-39: CPT | Performed by: FAMILY MEDICINE

## 2024-07-11 RX ORDER — ESCITALOPRAM OXALATE 10 MG/1
15 TABLET ORAL DAILY
Qty: 135 TABLET | Refills: 1 | Status: SHIPPED | OUTPATIENT
Start: 2024-07-11

## 2024-07-11 RX ORDER — DEXTROAMPHETAMINE SACCHARATE, AMPHETAMINE ASPARTATE, DEXTROAMPHETAMINE SULFATE AND AMPHETAMINE SULFATE 3.75; 3.75; 3.75; 3.75 MG/1; MG/1; MG/1; MG/1
15 TABLET ORAL 2 TIMES DAILY
Qty: 60 TABLET | Refills: 0 | Status: SHIPPED | OUTPATIENT
Start: 2024-07-11 | End: 2024-08-10

## 2024-07-11 RX ORDER — DEXTROAMPHETAMINE SACCHARATE, AMPHETAMINE ASPARTATE, DEXTROAMPHETAMINE SULFATE AND AMPHETAMINE SULFATE 3.75; 3.75; 3.75; 3.75 MG/1; MG/1; MG/1; MG/1
15 TABLET ORAL 2 TIMES DAILY
Qty: 60 TABLET | Refills: 0 | Status: SHIPPED | OUTPATIENT
Start: 2024-09-11 | End: 2024-10-11

## 2024-07-11 RX ORDER — ALPRAZOLAM 0.25 MG/1
0.25 TABLET ORAL 2 TIMES DAILY
Qty: 180 TABLET | Refills: 1 | Status: SHIPPED | OUTPATIENT
Start: 2024-07-11 | End: 2025-01-07

## 2024-07-11 RX ORDER — DEXTROAMPHETAMINE SACCHARATE, AMPHETAMINE ASPARTATE, DEXTROAMPHETAMINE SULFATE AND AMPHETAMINE SULFATE 3.75; 3.75; 3.75; 3.75 MG/1; MG/1; MG/1; MG/1
15 TABLET ORAL 2 TIMES DAILY
Qty: 60 TABLET | Refills: 0 | Status: SHIPPED | OUTPATIENT
Start: 2024-08-11 | End: 2024-09-10

## 2024-07-11 ASSESSMENT — ENCOUNTER SYMPTOMS
CONSTIPATION: 0
DIARRHEA: 0
WHEEZING: 0
SHORTNESS OF BREATH: 0
NAUSEA: 0
COUGH: 0
VOMITING: 0
BACK PAIN: 0
ABDOMINAL PAIN: 0

## 2024-07-11 NOTE — PROGRESS NOTES
24  Delroy D Plate : 1985 Sex: male  Age: 39 y.o.    Chief Complaint   Patient presents with    Annual Exam     HPI:  39 y.o. male presents today for yearly physical and medication refills.  Patient's chart, medical, surgical and medication history all reviewed.    Well Adult Physical  Patient here for a physical exam.  The patient reports no problems.    Do you take any herbs or supplements that were not prescribed by a doctor? no   Are you taking calcium supplements? no   Are you taking aspirin daily? no    Colonoscopy: No prior colonoscopy  Dental visit: Within last 6 mos  Vision check:  Glasses    Last time routine bloodwork was done:  2024    Immunization status: up to date and documented.    Smoking status: never    Physical activity: frequently    ROS:  Review of Systems   Constitutional:  Negative for chills, fatigue and fever.   Respiratory:  Negative for cough, shortness of breath and wheezing.    Cardiovascular:  Negative for chest pain and palpitations.   Gastrointestinal:  Negative for abdominal pain, constipation, diarrhea, nausea and vomiting.   Musculoskeletal:  Negative for arthralgias and back pain.   Skin:  Negative for rash.   Neurological:  Negative for dizziness and headaches.   Psychiatric/Behavioral:  Positive for decreased concentration. Negative for dysphoric mood. The patient is nervous/anxious and is hyperactive.    All other systems reviewed and are negative.     Current Outpatient Medications on File Prior to Visit   Medication Sig Dispense Refill    triamcinolone (ARISTOCORT) 0.5 % cream APPLY TO AFFECTED AREA TWICE A DAY 15 g 5    Multiple Vitamin (MULTI-VITAMIN DAILY) TABS Take 1 tablet by mouth daily       No current facility-administered medications on file prior to visit.       No Known Allergies    Past Medical History:   Diagnosis Date    ADHD (attention deficit hyperactivity disorder)     Anxiety     Sleep apnea 10/2022    Obstructive Sleep Apnea. 29 AHI w/o

## 2024-07-28 DIAGNOSIS — L30.1 DYSHIDROTIC ECZEMA: ICD-10-CM

## 2024-07-29 RX ORDER — TRIAMCINOLONE ACETONIDE 5 MG/G
CREAM TOPICAL
Qty: 15 G | Refills: 5 | Status: SHIPPED | OUTPATIENT
Start: 2024-07-29

## 2024-10-10 ENCOUNTER — OFFICE VISIT (OUTPATIENT)
Dept: PRIMARY CARE CLINIC | Age: 39
End: 2024-10-10
Payer: COMMERCIAL

## 2024-10-10 VITALS
OXYGEN SATURATION: 97 % | HEART RATE: 72 BPM | BODY MASS INDEX: 22.82 KG/M2 | TEMPERATURE: 97.4 F | WEIGHT: 159.4 LBS | DIASTOLIC BLOOD PRESSURE: 74 MMHG | HEIGHT: 70 IN | SYSTOLIC BLOOD PRESSURE: 130 MMHG

## 2024-10-10 DIAGNOSIS — E78.2 MIXED HYPERLIPIDEMIA: ICD-10-CM

## 2024-10-10 DIAGNOSIS — F41.0 PANIC DISORDER WITHOUT AGORAPHOBIA: ICD-10-CM

## 2024-10-10 DIAGNOSIS — R73.01 IMPAIRED FASTING GLUCOSE: ICD-10-CM

## 2024-10-10 DIAGNOSIS — E55.9 VITAMIN D INSUFFICIENCY: ICD-10-CM

## 2024-10-10 DIAGNOSIS — F41.1 GENERALIZED ANXIETY DISORDER: ICD-10-CM

## 2024-10-10 DIAGNOSIS — F90.0 ADHD, PREDOMINANTLY INATTENTIVE TYPE: Primary | ICD-10-CM

## 2024-10-10 PROCEDURE — 99214 OFFICE O/P EST MOD 30 MIN: CPT | Performed by: FAMILY MEDICINE

## 2024-10-10 RX ORDER — DEXTROAMPHETAMINE SACCHARATE, AMPHETAMINE ASPARTATE, DEXTROAMPHETAMINE SULFATE AND AMPHETAMINE SULFATE 3.75; 3.75; 3.75; 3.75 MG/1; MG/1; MG/1; MG/1
15 TABLET ORAL 2 TIMES DAILY
Qty: 60 TABLET | Refills: 0 | Status: SHIPPED | OUTPATIENT
Start: 2024-11-07 | End: 2024-12-07

## 2024-10-10 RX ORDER — ESCITALOPRAM OXALATE 10 MG/1
15 TABLET ORAL DAILY
Qty: 135 TABLET | Refills: 1 | Status: CANCELLED | OUTPATIENT
Start: 2024-10-10

## 2024-10-10 RX ORDER — ALPRAZOLAM 0.25 MG
0.25 TABLET ORAL 2 TIMES DAILY
Qty: 180 TABLET | Refills: 1 | Status: CANCELLED | OUTPATIENT
Start: 2024-10-10 | End: 2025-04-08

## 2024-10-10 RX ORDER — DEXTROAMPHETAMINE SACCHARATE, AMPHETAMINE ASPARTATE, DEXTROAMPHETAMINE SULFATE AND AMPHETAMINE SULFATE 3.75; 3.75; 3.75; 3.75 MG/1; MG/1; MG/1; MG/1
15 TABLET ORAL 2 TIMES DAILY
Qty: 60 TABLET | Refills: 0 | Status: SHIPPED | OUTPATIENT
Start: 2024-12-05 | End: 2025-01-04

## 2024-10-10 RX ORDER — DEXTROAMPHETAMINE SACCHARATE, AMPHETAMINE ASPARTATE, DEXTROAMPHETAMINE SULFATE AND AMPHETAMINE SULFATE 3.75; 3.75; 3.75; 3.75 MG/1; MG/1; MG/1; MG/1
15 TABLET ORAL 2 TIMES DAILY
Qty: 60 TABLET | Refills: 0 | Status: SHIPPED | OUTPATIENT
Start: 2024-10-10 | End: 2024-11-09

## 2024-10-10 ASSESSMENT — ENCOUNTER SYMPTOMS
CONSTIPATION: 0
DIARRHEA: 0
NAUSEA: 0
ABDOMINAL PAIN: 0
SHORTNESS OF BREATH: 0
VOMITING: 0
COUGH: 0
WHEEZING: 0
BACK PAIN: 0

## 2024-10-10 NOTE — ASSESSMENT & PLAN NOTE
OARRS reviewed and is appropriate.  Tolerating dose well.     Orders:    amphetamine-dextroamphetamine (ADDERALL, 15MG,) 15 MG tablet; Take 1 tablet by mouth 2 times daily for 30 days. Max Daily Amount: 30 mg    amphetamine-dextroamphetamine (ADDERALL, 15MG,) 15 MG tablet; Take 1 tablet by mouth 2 times daily for 30 days. Max Daily Amount: 30 mg    amphetamine-dextroamphetamine (ADDERALL, 15MG,) 15 MG tablet; Take 1 tablet by mouth 2 times daily for 30 days. Max Daily Amount: 30 mg

## 2024-10-10 NOTE — PROGRESS NOTES
10/10/24  Delroy Means : 1985 Sex: male  Age: 39 y.o.    Chief Complaint   Patient presents with    ADHD     HPI:  39 y.o. male patient presents today for 3 month(s) follow up of chronic medical conditions, medication refills and FBW. Patient's chart, medical, surgical and medication history all reviewed.    ADHD  Patient presents for follow up of ADHD.  He has been on medication for ADHD for many year(s).  His current medication is Adderall IR- 15 mg BID.  Previously on Ritalin LA- 20 mg BID.  Patient has been on current medication for  many  year(s).  Side effects of medications include None.  compliant all of the time.  Symptoms that have improved include impulsivity, inability to follow directions and inattention.   Patient had to switch to Adderall due to shortage of Ritalin.  Doing well on Adderall.     Anxiety  Patient complains of evaluation of anxiety disorder and panic attacks.  He has the following anxiety symptoms: difficulty concentrating, feelings of losing control, irritable, racing thoughts. Onset of symptoms was approximately several years ago, stable since that time. He denies current suicidal and homicidal ideation.  Previous treatment includes Lexapro and Xanax and  no therapy .  He complains of the following side effects from the treatment: none.  Patient states that he hasn't had a panic attack in a long time.     Hyperlipidemia  The ASCVD Risk score (Dumont DK, et al., 2019) failed to calculate for the following reasons:    The 2019 ASCVD risk score is only valid for ages 40 to 79    ISSA:   Patient presents for follow up of ISSA.  Patient is  using CPAP 8 hours/night.   Patient does not use during naps.  does use humidifier.  No snoring on CPAP.  Tolerating mask and pressure well?: Yes.  Mask fitting problem including leak?: No  Significant daytime sleepiness?: No  Dry nose or throat?: No   Nocturia?: No  Edema? No  BP is well controlled.  Headache in am?: No  Weight?:

## 2024-11-11 ENCOUNTER — TELEMEDICINE ON DEMAND (OUTPATIENT)
Age: 39
End: 2024-11-11
Payer: COMMERCIAL

## 2024-11-11 DIAGNOSIS — J06.9 VIRAL URI WITH COUGH: Primary | ICD-10-CM

## 2024-11-11 PROCEDURE — 99212 OFFICE O/P EST SF 10 MIN: CPT | Performed by: NURSE PRACTITIONER

## 2024-11-11 RX ORDER — SOD CHLOR,BICARB/SQUEEZ BOTTLE
1 PACKET, WITH RINSE DEVICE NASAL 2 TIMES DAILY PRN
Qty: 1 EACH | Refills: 0 | Status: SHIPPED | OUTPATIENT
Start: 2024-11-11 | End: 2024-12-11

## 2024-11-11 RX ORDER — BROMPHENIRAMINE MALEATE, PSEUDOEPHEDRINE HYDROCHLORIDE, AND DEXTROMETHORPHAN HYDROBROMIDE 2; 30; 10 MG/5ML; MG/5ML; MG/5ML
5 SYRUP ORAL 4 TIMES DAILY PRN
Qty: 200 ML | Refills: 0 | Status: SHIPPED | OUTPATIENT
Start: 2024-11-11

## 2024-11-11 ASSESSMENT — ENCOUNTER SYMPTOMS: COUGH: 1

## 2024-11-11 NOTE — PROGRESS NOTES
Delroy Means (:  1985) is a Established patient, here for evaluation of the following:    Cough (X2 weeks, productive cough, using OTC treatment with no relief)       Assessment & Plan:  Below is the assessment and plan developed based on review of pertinent history, physical exam, labs, studies, and medications.  1. Viral URI with cough  -     brompheniramine-pseudoephedrine-DM 2-30-10 MG/5ML syrup; Take 5 mLs by mouth 4 times daily as needed for Cough, Disp-200 mL, R-0Normal  -     Hypertonic Nasal Wash (SINUS RINSE BOTTLE KIT) PACK; 1 packet by Nasal route 2 times daily as needed (congestion), Disp-1 each, R-0Normal  The patient was educated on reasons to seek urgent medical care including chest pain, shortness of breath or difficulty breathing at rest, severe pain, fever >102 not controlled by medication, blue-tinged lips or nailbeds, and severe weakness or confusion.   The patient would benefit from future follow up with their usual PCP. As of the end of their Virtualist Visit today, follow up visit status is as follows: Patient to follow up as previously instructed by PCP      Return if symptoms worsen or fail to improve.    Subjective:   Tried mucinex for a week without improvement. Coughing primarily in the morning after being awake for a couple of hours, in the afternoon, and in the evening primarily. No blood noted in sputum, only yellow. Still exercising, biking 20-40 miles weekly, hacking after completing workouts. Also feels like he has sinus congestion, PND. Difficult to inhale on the    Cough  This is a new problem. The current episode started 1 to 4 weeks ago. The problem occurs constantly. The cough is Productive of purulent sputum. Pertinent negatives include no fever. The treatment provided no relief. His past medical history is significant for pneumonia.     Review of Systems   Constitutional:  Negative for fever.   Respiratory:  Positive for cough.        Objective:  Patient-Reported

## 2025-01-07 DIAGNOSIS — E55.9 VITAMIN D INSUFFICIENCY: ICD-10-CM

## 2025-01-07 DIAGNOSIS — E78.2 MIXED HYPERLIPIDEMIA: ICD-10-CM

## 2025-01-07 DIAGNOSIS — R73.01 IMPAIRED FASTING GLUCOSE: ICD-10-CM

## 2025-01-07 LAB
ALBUMIN: 5.1 G/DL (ref 3.5–5.2)
ALP BLD-CCNC: 77 U/L (ref 40–129)
ALT SERPL-CCNC: 9 U/L (ref 0–40)
ANION GAP SERPL CALCULATED.3IONS-SCNC: 17 MMOL/L (ref 7–16)
AST SERPL-CCNC: 20 U/L (ref 0–39)
BASOPHILS ABSOLUTE: 0.03 K/UL (ref 0–0.2)
BASOPHILS RELATIVE PERCENT: 1 % (ref 0–2)
BILIRUB SERPL-MCNC: 1.7 MG/DL (ref 0–1.2)
BILIRUBIN, URINE: NEGATIVE
BUN BLDV-MCNC: 14 MG/DL (ref 6–20)
CALCIUM SERPL-MCNC: 10.2 MG/DL (ref 8.6–10.2)
CHLORIDE BLD-SCNC: 100 MMOL/L (ref 98–107)
CHOLESTEROL, TOTAL: 182 MG/DL
CO2: 26 MMOL/L (ref 22–29)
COLOR, UA: YELLOW
CREAT SERPL-MCNC: 1 MG/DL (ref 0.7–1.2)
EOSINOPHILS ABSOLUTE: 0.13 K/UL (ref 0.05–0.5)
EOSINOPHILS RELATIVE PERCENT: 2 % (ref 0–6)
GFR, ESTIMATED: >90 ML/MIN/1.73M2
GLUCOSE BLD-MCNC: 105 MG/DL (ref 74–99)
GLUCOSE URINE: NEGATIVE MG/DL
HBA1C MFR BLD: 5.2 % (ref 4–5.6)
HCT VFR BLD CALC: 47 % (ref 37–54)
HDLC SERPL-MCNC: 55 MG/DL
HEMOGLOBIN: 15.4 G/DL (ref 12.5–16.5)
IMMATURE GRANULOCYTES %: 0 % (ref 0–5)
IMMATURE GRANULOCYTES ABSOLUTE: <0.03 K/UL (ref 0–0.58)
KETONES, URINE: NEGATIVE MG/DL
LDL CHOLESTEROL: 111 MG/DL
LEUKOCYTE ESTERASE, URINE: ABNORMAL
LYMPHOCYTES ABSOLUTE: 1.25 K/UL (ref 1.5–4)
LYMPHOCYTES RELATIVE PERCENT: 22 % (ref 20–42)
MCH RBC QN AUTO: 29 PG (ref 26–35)
MCHC RBC AUTO-ENTMCNC: 32.8 G/DL (ref 32–34.5)
MCV RBC AUTO: 88.5 FL (ref 80–99.9)
MONOCYTES ABSOLUTE: 0.33 K/UL (ref 0.1–0.95)
MONOCYTES RELATIVE PERCENT: 6 % (ref 2–12)
NEUTROPHILS ABSOLUTE: 4.07 K/UL (ref 1.8–7.3)
NEUTROPHILS RELATIVE PERCENT: 70 % (ref 43–80)
NITRITE, URINE: NEGATIVE
PDW BLD-RTO: 12.4 % (ref 11.5–15)
PH, URINE: 6.5 (ref 5–9)
PLATELET # BLD: 318 K/UL (ref 130–450)
PMV BLD AUTO: 10 FL (ref 7–12)
POTASSIUM SERPL-SCNC: 4.3 MMOL/L (ref 3.5–5)
PROTEIN UA: NEGATIVE MG/DL
RBC # BLD: 5.31 M/UL (ref 3.8–5.8)
RBC UA: NORMAL /HPF
SODIUM BLD-SCNC: 143 MMOL/L (ref 132–146)
SPECIFIC GRAVITY UA: <1.005 (ref 1–1.03)
TOTAL PROTEIN: 8.6 G/DL (ref 6.4–8.3)
TRIGL SERPL-MCNC: 78 MG/DL
TSH SERPL DL<=0.05 MIU/L-ACNC: 3.82 UIU/ML (ref 0.27–4.2)
TURBIDITY: CLEAR
URINE HGB: NEGATIVE
UROBILINOGEN, URINE: 0.2 EU/DL (ref 0–1)
VITAMIN D 25-HYDROXY: 40.4 NG/ML (ref 30–100)
VLDLC SERPL CALC-MCNC: 16 MG/DL
WBC # BLD: 5.8 K/UL (ref 4.5–11.5)
WBC UA: NORMAL /HPF

## 2025-01-07 ASSESSMENT — PATIENT HEALTH QUESTIONNAIRE - PHQ9
1. LITTLE INTEREST OR PLEASURE IN DOING THINGS: NOT AT ALL
SUM OF ALL RESPONSES TO PHQ QUESTIONS 1-9: 0
SUM OF ALL RESPONSES TO PHQ QUESTIONS 1-9: 0
1. LITTLE INTEREST OR PLEASURE IN DOING THINGS: NOT AT ALL
SUM OF ALL RESPONSES TO PHQ9 QUESTIONS 1 & 2: 0
2. FEELING DOWN, DEPRESSED OR HOPELESS: NOT AT ALL
SUM OF ALL RESPONSES TO PHQ9 QUESTIONS 1 & 2: 0
2. FEELING DOWN, DEPRESSED OR HOPELESS: NOT AT ALL
SUM OF ALL RESPONSES TO PHQ QUESTIONS 1-9: 0
SUM OF ALL RESPONSES TO PHQ QUESTIONS 1-9: 0

## 2025-01-09 ENCOUNTER — OFFICE VISIT (OUTPATIENT)
Dept: PRIMARY CARE CLINIC | Age: 40
End: 2025-01-09

## 2025-01-09 VITALS
HEART RATE: 88 BPM | HEIGHT: 70 IN | TEMPERATURE: 98.9 F | WEIGHT: 159 LBS | SYSTOLIC BLOOD PRESSURE: 140 MMHG | OXYGEN SATURATION: 99 % | BODY MASS INDEX: 22.76 KG/M2 | DIASTOLIC BLOOD PRESSURE: 88 MMHG

## 2025-01-09 DIAGNOSIS — F90.0 ADHD, PREDOMINANTLY INATTENTIVE TYPE: Primary | ICD-10-CM

## 2025-01-09 DIAGNOSIS — F41.0 PANIC DISORDER WITHOUT AGORAPHOBIA: ICD-10-CM

## 2025-01-09 DIAGNOSIS — F41.1 GENERALIZED ANXIETY DISORDER: ICD-10-CM

## 2025-01-09 RX ORDER — DEXTROAMPHETAMINE SACCHARATE, AMPHETAMINE ASPARTATE, DEXTROAMPHETAMINE SULFATE AND AMPHETAMINE SULFATE 3.75; 3.75; 3.75; 3.75 MG/1; MG/1; MG/1; MG/1
15 TABLET ORAL 2 TIMES DAILY
Qty: 60 TABLET | Refills: 0 | Status: SHIPPED | OUTPATIENT
Start: 2025-02-06 | End: 2025-03-08

## 2025-01-09 RX ORDER — DEXTROAMPHETAMINE SACCHARATE, AMPHETAMINE ASPARTATE, DEXTROAMPHETAMINE SULFATE AND AMPHETAMINE SULFATE 3.75; 3.75; 3.75; 3.75 MG/1; MG/1; MG/1; MG/1
15 TABLET ORAL 2 TIMES DAILY
Qty: 60 TABLET | Refills: 0 | Status: SHIPPED | OUTPATIENT
Start: 2025-01-09 | End: 2025-02-08

## 2025-01-09 RX ORDER — ALPRAZOLAM 0.25 MG/1
0.25 TABLET ORAL 2 TIMES DAILY
Qty: 180 TABLET | Refills: 1 | Status: SHIPPED | OUTPATIENT
Start: 2025-01-09 | End: 2025-07-08

## 2025-01-09 RX ORDER — ESCITALOPRAM OXALATE 10 MG/1
15 TABLET ORAL DAILY
Qty: 135 TABLET | Refills: 1 | Status: SHIPPED | OUTPATIENT
Start: 2025-01-09

## 2025-01-09 RX ORDER — DEXTROAMPHETAMINE SACCHARATE, AMPHETAMINE ASPARTATE, DEXTROAMPHETAMINE SULFATE AND AMPHETAMINE SULFATE 3.75; 3.75; 3.75; 3.75 MG/1; MG/1; MG/1; MG/1
15 TABLET ORAL 2 TIMES DAILY
Qty: 60 TABLET | Refills: 0 | Status: SHIPPED | OUTPATIENT
Start: 2025-03-06 | End: 2025-04-05

## 2025-01-09 SDOH — ECONOMIC STABILITY: FOOD INSECURITY: WITHIN THE PAST 12 MONTHS, YOU WORRIED THAT YOUR FOOD WOULD RUN OUT BEFORE YOU GOT MONEY TO BUY MORE.: NEVER TRUE

## 2025-01-09 SDOH — ECONOMIC STABILITY: FOOD INSECURITY: WITHIN THE PAST 12 MONTHS, THE FOOD YOU BOUGHT JUST DIDN'T LAST AND YOU DIDN'T HAVE MONEY TO GET MORE.: NEVER TRUE

## 2025-01-09 ASSESSMENT — ENCOUNTER SYMPTOMS
SHORTNESS OF BREATH: 0
ABDOMINAL PAIN: 0
CONSTIPATION: 0
BACK PAIN: 0
VOMITING: 0
WHEEZING: 0
NAUSEA: 0
COUGH: 0
DIARRHEA: 0

## 2025-01-09 NOTE — PROGRESS NOTES
breath sounds. No wheezing.   Musculoskeletal:         General: No tenderness or deformity. Normal range of motion.      Cervical back: Normal range of motion and neck supple.      Right lower leg: No edema.      Left lower leg: No edema.   Lymphadenopathy:      Cervical: No cervical adenopathy.   Skin:     General: Skin is warm and dry.      Findings: No rash.   Neurological:      General: No focal deficit present.      Mental Status: He is alert and oriented to person, place, and time.      Gait: Gait normal.   Psychiatric:         Mood and Affect: Mood and affect normal.         Speech: Speech normal.         Behavior: Behavior normal.         Thought Content: Thought content normal.         Labs:  CBC with Differential:    Lab Results   Component Value Date/Time    WBC 5.8 01/07/2025 07:12 AM    RBC 5.31 01/07/2025 07:12 AM    HGB 15.4 01/07/2025 07:12 AM    HCT 47.0 01/07/2025 07:12 AM     01/07/2025 07:12 AM    MCV 88.5 01/07/2025 07:12 AM    MCH 29.0 01/07/2025 07:12 AM    MCHC 32.8 01/07/2025 07:12 AM    RDW 12.4 01/07/2025 07:12 AM    LYMPHOPCT 22 01/07/2025 07:12 AM    MONOPCT 6 01/07/2025 07:12 AM    EOSPCT 2 01/07/2025 07:12 AM    BASOPCT 1 01/07/2025 07:12 AM    MONOSABS 0.33 01/07/2025 07:12 AM    LYMPHSABS 1.25 01/07/2025 07:12 AM    EOSABS 0.13 01/07/2025 07:12 AM    BASOSABS 0.03 01/07/2025 07:12 AM     CMP:    Lab Results   Component Value Date/Time     01/07/2025 07:12 AM    K 4.3 01/07/2025 07:12 AM     01/07/2025 07:12 AM    CO2 26 01/07/2025 07:12 AM    BUN 14 01/07/2025 07:12 AM    CREATININE 1.0 01/07/2025 07:12 AM    GFRAA >60 12/14/2021 07:52 AM    LABGLOM >90 01/07/2025 07:12 AM    LABGLOM >60 01/08/2024 07:41 AM    GLUCOSE 105 01/07/2025 07:12 AM    CALCIUM 10.2 01/07/2025 07:12 AM    BILITOT 1.7 01/07/2025 07:12 AM    ALKPHOS 77 01/07/2025 07:12 AM    AST 20 01/07/2025 07:12 AM    ALT 9 01/07/2025 07:12 AM     HgBA1c:    Lab Results   Component Value Date/Time

## 2025-01-09 NOTE — ASSESSMENT & PLAN NOTE
Stable    Orders:    ALPRAZolam (XANAX) 0.25 MG tablet; Take 1 tablet by mouth 2 times daily for 180 days. Max Daily Amount: 0.5 mg

## 2025-02-06 ASSESSMENT — SLEEP AND FATIGUE QUESTIONNAIRES
HOW LIKELY ARE YOU TO NOD OFF OR FALL ASLEEP WHILE WATCHING TV: SLIGHT CHANCE OF DOZING
ESS TOTAL SCORE: 8
HOW LIKELY ARE YOU TO NOD OFF OR FALL ASLEEP WHILE SITTING QUIETLY AFTER LUNCH WITHOUT ALCOHOL: SLIGHT CHANCE OF DOZING
HOW LIKELY ARE YOU TO NOD OFF OR FALL ASLEEP WHILE SITTING INACTIVE IN A PUBLIC PLACE: WOULD NEVER DOZE
HOW LIKELY ARE YOU TO NOD OFF OR FALL ASLEEP WHEN YOU ARE A PASSENGER IN A CAR FOR AN HOUR WITHOUT A BREAK: SLIGHT CHANCE OF DOZING
HOW LIKELY ARE YOU TO NOD OFF OR FALL ASLEEP WHILE SITTING AND READING: MODERATE CHANCE OF DOZING
HOW LIKELY ARE YOU TO NOD OFF OR FALL ASLEEP IN A CAR, WHILE STOPPED FOR A FEW MINUTES IN TRAFFIC: WOULD NEVER DOZE
HOW LIKELY ARE YOU TO NOD OFF OR FALL ASLEEP WHILE LYING DOWN TO REST IN THE AFTERNOON WHEN CIRCUMSTANCES PERMIT: MODERATE CHANCE OF DOZING
HOW LIKELY ARE YOU TO NOD OFF OR FALL ASLEEP WHILE SITTING AND TALKING TO SOMEONE: SLIGHT CHANCE OF DOZING
HOW LIKELY ARE YOU TO NOD OFF OR FALL ASLEEP WHILE SITTING QUIETLY AFTER LUNCH WITHOUT ALCOHOL: SLIGHT CHANCE OF DOZING
HOW LIKELY ARE YOU TO NOD OFF OR FALL ASLEEP WHILE SITTING AND READING: MODERATE CHANCE OF DOZING
HOW LIKELY ARE YOU TO NOD OFF OR FALL ASLEEP WHILE WATCHING TV: SLIGHT CHANCE OF DOZING
HOW LIKELY ARE YOU TO NOD OFF OR FALL ASLEEP WHILE SITTING INACTIVE IN A PUBLIC PLACE: WOULD NEVER DOZE
HOW LIKELY ARE YOU TO NOD OFF OR FALL ASLEEP WHEN YOU ARE A PASSENGER IN A CAR FOR AN HOUR WITHOUT A BREAK: SLIGHT CHANCE OF DOZING
HOW LIKELY ARE YOU TO NOD OFF OR FALL ASLEEP IN A CAR, WHILE STOPPED FOR A FEW MINUTES IN TRAFFIC: WOULD NEVER DOZE
HOW LIKELY ARE YOU TO NOD OFF OR FALL ASLEEP WHILE LYING DOWN TO REST IN THE AFTERNOON WHEN CIRCUMSTANCES PERMIT: MODERATE CHANCE OF DOZING
HOW LIKELY ARE YOU TO NOD OFF OR FALL ASLEEP WHILE SITTING AND TALKING TO SOMEONE: SLIGHT CHANCE OF DOZING

## 2025-02-10 ENCOUNTER — TELEMEDICINE (OUTPATIENT)
Dept: SLEEP MEDICINE | Age: 40
End: 2025-02-10
Payer: COMMERCIAL

## 2025-02-10 DIAGNOSIS — G47.33 OSA (OBSTRUCTIVE SLEEP APNEA): Primary | ICD-10-CM

## 2025-02-10 DIAGNOSIS — G47.10 HYPERSOMNOLENCE: ICD-10-CM

## 2025-02-10 PROCEDURE — 99214 OFFICE O/P EST MOD 30 MIN: CPT | Performed by: INTERNAL MEDICINE

## 2025-02-10 NOTE — PROGRESS NOTES
DAY 15 g 5    Multiple Vitamin (MULTI-VITAMIN DAILY) TABS Take 1 tablet by mouth daily      amphetamine-dextroamphetamine (ADDERALL, 15MG,) 15 MG tablet Take 1 tablet by mouth 2 times daily for 30 days. Max Daily Amount: 30 mg 60 tablet 0     No current facility-administered medications for this visit.        Review of Systems  (Sleep ROS above)  Review of Systems         Objective:   Physical Exam  There were no vitals taken for this visit.       Physical Exam            2/6/2025     5:37 PM 2/14/2024    10:21 AM 2/22/2023     8:28 AM 9/20/2022     2:24 PM   Sleep Medicine   Sitting and reading 2 3 2    Watching TV 1 1 2 3   Sitting, inactive in a public place (e.g. a theatre or a meeting) 0 0 1 1   As a passenger in a car for an hour without a break 1 1 0 1   Lying down to rest in the afternoon when circumstances permit 2 2 2 3   Sitting and talking to someone 1 1 1 1   Sitting quietly after a lunch without alcohol 1 1 1 2   In a car, while stopped for a few minutes in traffic 0 0 0 0   [place] Sleepiness Score 8 9 9    Neck (Inches)    14.5         SLEEP STUDY HISTORY      10-13-22 HST watchpat DAVID 28                  PERTINENT LAB RESULTS  No results found for: \"FERRITIN\"   Lab Results   Component Value Date    WBC 5.8 01/07/2025    HGB 15.4 01/07/2025    HCT 47.0 01/07/2025    MCV 88.5 01/07/2025     01/07/2025     Lab Results   Component Value Date/Time     01/07/2025 07:12 AM    K 4.3 01/07/2025 07:12 AM     01/07/2025 07:12 AM    CO2 26 01/07/2025 07:12 AM    BUN 14 01/07/2025 07:12 AM    CREATININE 1.0 01/07/2025 07:12 AM    GLUCOSE 105 01/07/2025 07:12 AM    CALCIUM 10.2 01/07/2025 07:12 AM    LABGLOM >90 01/07/2025 07:12 AM    LABGLOM >60 01/08/2024 07:41 AM      Wt Readings from Last 3 Encounters:   01/09/25 72.1 kg (159 lb)   10/10/24 72.3 kg (159 lb 6.4 oz)   07/11/24 70.3 kg (155 lb)

## 2025-04-03 ENCOUNTER — TELEMEDICINE ON DEMAND (OUTPATIENT)
Age: 40
End: 2025-04-03
Payer: COMMERCIAL

## 2025-04-03 DIAGNOSIS — H10.13 ALLERGIC CONJUNCTIVITIS OF BOTH EYES: Primary | ICD-10-CM

## 2025-04-03 PROCEDURE — 99213 OFFICE O/P EST LOW 20 MIN: CPT | Performed by: NURSE PRACTITIONER

## 2025-04-03 RX ORDER — OLOPATADINE HYDROCHLORIDE 1 MG/ML
SOLUTION/ DROPS OPHTHALMIC
Qty: 5 ML | Refills: 0 | Status: SHIPPED | OUTPATIENT
Start: 2025-04-03

## 2025-04-03 ASSESSMENT — ENCOUNTER SYMPTOMS
RESPIRATORY NEGATIVE: 1
EYE PAIN: 0
PHOTOPHOBIA: 0
EYE DISCHARGE: 1
EYE ITCHING: 1
RHINORRHEA: 0
EYE REDNESS: 1
GASTROINTESTINAL NEGATIVE: 1

## 2025-04-03 NOTE — PROGRESS NOTES
[facility] Virtualist Encounter Note       Chief Complaint   Patient presents with    Eye Swelling     Bilateral eye swelling/puffiness, drainage, itch, crusting, unsure if allergies or pinkeye        HPI:    Delroy Means (:  1985) has requested an audio/video evaluation for the following concern(s):    This is an established patient of Dr. Wilsons. This is the first time I am seeing the patient today. He requested this visit for bilat pink eye. Started a couple days ago with red puffy eyes and swelling, some stringy type drainage and bialt crust at outer corners of eyes. He has not had pink eye before. He does have allergies and not sure if that may be related. His daughter did go to a friends house and they have dogs and she may have brought that home on her clothes. Not typical appearance of bilat bacterial conjunctivitis and more likely allergic. Denies fever/chills. No injury or trauma to eye.Does wear glasses. Does not wear contacts. Vision is not affected. No issues with exposure to light. No pain in eye itself. Eyelid is slightly pink. Has slight PND but no congestion, sore throat, or other URI symptoms at this time.    Review of Systems   Constitutional:  Negative for chills and fever.   HENT:  Positive for postnasal drip. Negative for congestion and rhinorrhea.    Eyes:  Positive for discharge, redness and itching. Negative for photophobia, pain and visual disturbance.   Respiratory: Negative.     Cardiovascular: Negative.    Gastrointestinal: Negative.    Genitourinary: Negative.    Musculoskeletal: Negative.    Skin: Negative.    Neurological: Negative.        Prior to Visit Medications    Medication Sig Taking? Authorizing Provider   olopatadine (PATADAY) 0.1 % ophthalmic solution Instill 1 drop into each affected eye twice daily (allowing 6 to 8 hours between doses). Yes Zoey Lee, APRN - CNP   ALPRAZolam (XANAX) 0.25 MG tablet Take 1 tablet by mouth 2 times

## 2025-04-03 NOTE — PATIENT INSTRUCTIONS
Notify office if you have no improvement or worsening of condition.   Take medication as prescribed.  Practice good handwashing.  Please refer to educational handouts.  Follow up with PCP in 2 days if not improving sooner if worsening.

## 2025-04-15 ASSESSMENT — ENCOUNTER SYMPTOMS
VOMITING: 0
CONSTIPATION: 0
COUGH: 0
WHEEZING: 0
DIARRHEA: 0
BACK PAIN: 0
NAUSEA: 0
SHORTNESS OF BREATH: 0
ABDOMINAL PAIN: 0

## 2025-04-16 ENCOUNTER — OFFICE VISIT (OUTPATIENT)
Dept: PRIMARY CARE CLINIC | Age: 40
End: 2025-04-16
Payer: COMMERCIAL

## 2025-04-16 VITALS
SYSTOLIC BLOOD PRESSURE: 124 MMHG | HEART RATE: 81 BPM | OXYGEN SATURATION: 98 % | DIASTOLIC BLOOD PRESSURE: 76 MMHG | HEIGHT: 70 IN | WEIGHT: 165 LBS | BODY MASS INDEX: 23.62 KG/M2 | TEMPERATURE: 98.5 F

## 2025-04-16 DIAGNOSIS — F41.0 PANIC DISORDER WITHOUT AGORAPHOBIA: ICD-10-CM

## 2025-04-16 DIAGNOSIS — F90.0 ADHD, PREDOMINANTLY INATTENTIVE TYPE: Primary | ICD-10-CM

## 2025-04-16 DIAGNOSIS — F41.1 GENERALIZED ANXIETY DISORDER: ICD-10-CM

## 2025-04-16 PROCEDURE — 99214 OFFICE O/P EST MOD 30 MIN: CPT | Performed by: FAMILY MEDICINE

## 2025-04-16 RX ORDER — DEXTROAMPHETAMINE SACCHARATE, AMPHETAMINE ASPARTATE, DEXTROAMPHETAMINE SULFATE AND AMPHETAMINE SULFATE 3.75; 3.75; 3.75; 3.75 MG/1; MG/1; MG/1; MG/1
15 TABLET ORAL 2 TIMES DAILY
Qty: 60 TABLET | Refills: 0 | Status: SHIPPED | OUTPATIENT
Start: 2025-04-16 | End: 2025-05-16

## 2025-04-16 RX ORDER — ALPRAZOLAM 0.25 MG
0.25 TABLET ORAL 2 TIMES DAILY
Qty: 180 TABLET | Refills: 1 | Status: SHIPPED | OUTPATIENT
Start: 2025-04-16 | End: 2025-10-13

## 2025-04-16 RX ORDER — DEXTROAMPHETAMINE SACCHARATE, AMPHETAMINE ASPARTATE, DEXTROAMPHETAMINE SULFATE AND AMPHETAMINE SULFATE 3.75; 3.75; 3.75; 3.75 MG/1; MG/1; MG/1; MG/1
15 TABLET ORAL 2 TIMES DAILY
Qty: 60 TABLET | Refills: 0 | Status: SHIPPED | OUTPATIENT
Start: 2025-05-14 | End: 2025-06-13

## 2025-04-16 RX ORDER — DEXTROAMPHETAMINE SACCHARATE, AMPHETAMINE ASPARTATE, DEXTROAMPHETAMINE SULFATE AND AMPHETAMINE SULFATE 3.75; 3.75; 3.75; 3.75 MG/1; MG/1; MG/1; MG/1
15 TABLET ORAL 2 TIMES DAILY
Qty: 60 TABLET | Refills: 0 | Status: SHIPPED | OUTPATIENT
Start: 2025-06-11 | End: 2025-07-11

## 2025-04-16 RX ORDER — ESCITALOPRAM OXALATE 10 MG/1
15 TABLET ORAL DAILY
Qty: 135 TABLET | Refills: 1 | Status: SHIPPED | OUTPATIENT
Start: 2025-04-16

## 2025-04-16 NOTE — ASSESSMENT & PLAN NOTE
Orders:    ALPRAZolam (XANAX) 0.25 MG tablet; Take 1 tablet by mouth 2 times daily for 180 days. Max Daily Amount: 0.5 mg

## 2025-04-16 NOTE — ASSESSMENT & PLAN NOTE
Chronic, at goal (stable), continue current treatment plan    Orders:    escitalopram (LEXAPRO) 10 MG tablet; Take 1.5 tablets by mouth daily

## 2025-04-16 NOTE — ASSESSMENT & PLAN NOTE
OARRS reviewed and is appropriate.  Tolerating current dose well.  No signs of diversion nor abuse.     Orders:    amphetamine-dextroamphetamine (ADDERALL, 15MG,) 15 MG tablet; Take 1 tablet by mouth 2 times daily for 30 days. Max Daily Amount: 30 mg    amphetamine-dextroamphetamine (ADDERALL, 15MG,) 15 MG tablet; Take 1 tablet by mouth 2 times daily for 30 days. Max Daily Amount: 30 mg    amphetamine-dextroamphetamine (ADDERALL, 15MG,) 15 MG tablet; Take 1 tablet by mouth 2 times daily for 30 days. Max Daily Amount: 30 mg

## 2025-04-16 NOTE — PROGRESS NOTES
25  Delroy Means : 1985 Sex: male  Age: 39 y.o.    Chief Complaint   Patient presents with    ADHD    Anxiety     HPI:  39 y.o. male patient presents today for 3 month(s) follow up of chronic medical conditions, medication refills. Patient's chart, medical, surgical and medication history all reviewed.    ADHD  Patient presents for follow up of ADHD.  He has been on medication for ADHD for many year(s).  His current medication is Adderall IR- 15 mg BID.  Previously on Ritalin LA- 20 mg BID.  Patient has been on current medication for  many  year(s).  Side effects of medications include None.  compliant all of the time.  Symptoms that have improved include impulsivity, inability to follow directions and inattention.   Patient had to switch to Adderall due to shortage of Ritalin.  Doing well on Adderall.     Anxiety  Patient complains of evaluation of anxiety disorder and panic attacks.  He has the following anxiety symptoms: difficulty concentrating, feelings of losing control, irritable, racing thoughts. Onset of symptoms was approximately several years ago, stable since that time. He denies current suicidal and homicidal ideation.  Previous treatment includes Lexapro and Xanax and individual therapy.  He complains of the following side effects from the treatment: none.  Patient started going to counseling after the death of his best friend.  Noting that is has been extremely helpful to his mental health.    Hyperlipidemia  The ASCVD Risk score (Gui DK, et al., 2019) failed to calculate for the following reasons:    The 2019 ASCVD risk score is only valid for ages 40 to 79    ISSA:   Patient presents for follow up of ISSA.  Patient is  using CPAP 8 hours/night.   Patient does not use during naps.  does use humidifier.  No snoring on CPAP.  Tolerating mask and pressure well?: Yes.  Mask fitting problem including leak?: No  Significant daytime sleepiness?: No  Dry nose or throat?: No   Nocturia?: No

## 2025-07-16 ENCOUNTER — OFFICE VISIT (OUTPATIENT)
Dept: PRIMARY CARE CLINIC | Age: 40
End: 2025-07-16

## 2025-07-16 VITALS
DIASTOLIC BLOOD PRESSURE: 70 MMHG | HEART RATE: 71 BPM | TEMPERATURE: 97.5 F | BODY MASS INDEX: 23.77 KG/M2 | WEIGHT: 166 LBS | SYSTOLIC BLOOD PRESSURE: 122 MMHG | OXYGEN SATURATION: 95 % | HEIGHT: 70 IN

## 2025-07-16 DIAGNOSIS — F90.0 ADHD, PREDOMINANTLY INATTENTIVE TYPE: Primary | ICD-10-CM

## 2025-07-16 DIAGNOSIS — F41.1 GENERALIZED ANXIETY DISORDER: ICD-10-CM

## 2025-07-16 RX ORDER — DEXTROAMPHETAMINE SACCHARATE, AMPHETAMINE ASPARTATE, DEXTROAMPHETAMINE SULFATE AND AMPHETAMINE SULFATE 3.75; 3.75; 3.75; 3.75 MG/1; MG/1; MG/1; MG/1
15 TABLET ORAL 2 TIMES DAILY PRN
Qty: 60 TABLET | Refills: 0 | Status: SHIPPED | OUTPATIENT
Start: 2025-09-21 | End: 2025-10-21

## 2025-07-16 RX ORDER — DEXTROAMPHETAMINE SACCHARATE, AMPHETAMINE ASPARTATE, DEXTROAMPHETAMINE SULFATE AND AMPHETAMINE SULFATE 3.75; 3.75; 3.75; 3.75 MG/1; MG/1; MG/1; MG/1
15 TABLET ORAL 2 TIMES DAILY PRN
Qty: 60 TABLET | Refills: 0 | Status: SHIPPED | OUTPATIENT
Start: 2025-07-21 | End: 2025-08-20

## 2025-07-16 RX ORDER — DEXTROAMPHETAMINE SACCHARATE, AMPHETAMINE ASPARTATE, DEXTROAMPHETAMINE SULFATE AND AMPHETAMINE SULFATE 3.75; 3.75; 3.75; 3.75 MG/1; MG/1; MG/1; MG/1
15 TABLET ORAL 2 TIMES DAILY PRN
Qty: 60 TABLET | Refills: 0 | Status: SHIPPED | OUTPATIENT
Start: 2025-08-21 | End: 2025-09-20

## 2025-07-16 NOTE — PROGRESS NOTES
Delroy JAG Means : 1985 Sex: male  Age: 40 y.o.    Chief Complaint   Patient presents with    Medication Refill       HPI  HPI:       Patient Dr Paulmanjinderlianne presents today in her temporary absence for follow-up ADHD/anxiety.  Doing very well with patient, has been on and tolerating Adderall well, also Lexapro and alprazolam with good results.  Needs refill only on the Adderall.  Precautions reviewed.    Review of Systems  ROS:  Const: Denies chills, fever, malaise and sweats.  Eyes: Denies discharge, pain, redness and visual disturbance.  ENMT: Denies earaches, other ear symptoms. Denies nasal or sinus symptoms other than stated  above. Denies mouth and tongue lesions and sore throat.  CV: Denies chest discomfort, pain; diaphoresis, dizziness, edema, lightheadedness, orthopnea,  palpitations, syncope and near syncopal episode or any exertional symptoms  Resp: Denies cough, hemoptysis, pleuritic pain, SOB, sputum production and wheezing.  GI: Denies abdominal pain, change in bowel habits, hematochezia, melena, nausea and vomiting.  : Denies urinary symptoms including dysuria , urgency, frequency or hematuria.  Musculo: Denies musculoskeletal symptoms.  Skin: Denies bruising and rash.  Neuro: Denies headache, numbness, stiff neck, tingling and focal weakness slurred speech or facial  droop  Hema/Lymph: Denies bleeding/bruising tendency and enlarged lymph nodes        Current Outpatient Medications:     [START ON 2025] amphetamine-dextroamphetamine (ADDERALL, 15MG,) 15 MG tablet, Take 1 tablet by mouth 2 times daily as needed (adhd) for up to 30 days. Max Daily Amount: 30 mg, Disp: 60 tablet, Rfl: 0    [START ON 2025] amphetamine-dextroamphetamine (ADDERALL, 15MG,) 15 MG tablet, Take 1 tablet by mouth 2 times daily as needed (adhd) for up to 30 days. Max Daily Amount: 30 mg, Disp: 60 tablet, Rfl: 0    [START ON 2025] amphetamine-dextroamphetamine (ADDERALL, 15MG,) 15 MG tablet, Take 1 tablet by mouth

## 2025-08-19 DIAGNOSIS — L30.1 DYSHIDROTIC ECZEMA: ICD-10-CM

## 2025-08-19 RX ORDER — TRIAMCINOLONE ACETONIDE 5 MG/G
CREAM TOPICAL
Qty: 15 G | Refills: 0 | Status: SHIPPED | OUTPATIENT
Start: 2025-08-19